# Patient Record
Sex: MALE | Race: WHITE | NOT HISPANIC OR LATINO | Employment: OTHER | URBAN - METROPOLITAN AREA
[De-identification: names, ages, dates, MRNs, and addresses within clinical notes are randomized per-mention and may not be internally consistent; named-entity substitution may affect disease eponyms.]

---

## 2019-11-03 ENCOUNTER — HOSPITAL ENCOUNTER (OUTPATIENT)
Facility: MEDICAL CENTER | Age: 64
End: 2019-11-03

## 2019-11-04 ENCOUNTER — HOSPITAL ENCOUNTER (INPATIENT)
Facility: MEDICAL CENTER | Age: 64
LOS: 4 days | DRG: 246 | End: 2019-11-08
Attending: INTERNAL MEDICINE | Admitting: INTERNAL MEDICINE
Payer: MEDICARE

## 2019-11-04 ENCOUNTER — APPOINTMENT (OUTPATIENT)
Dept: CARDIOLOGY | Facility: MEDICAL CENTER | Age: 64
DRG: 246 | End: 2019-11-04
Attending: INTERNAL MEDICINE
Payer: MEDICARE

## 2019-11-04 ENCOUNTER — HOSPITAL ENCOUNTER (OUTPATIENT)
Dept: RADIOLOGY | Facility: MEDICAL CENTER | Age: 64
End: 2019-11-04

## 2019-11-04 DIAGNOSIS — I24.9 ACS (ACUTE CORONARY SYNDROME) (HCC): ICD-10-CM

## 2019-11-04 PROBLEM — I10 ESSENTIAL HYPERTENSION: Status: ACTIVE | Noted: 2019-11-04

## 2019-11-04 PROBLEM — I21.4 NSTEMI (NON-ST ELEVATED MYOCARDIAL INFARCTION) (HCC): Status: ACTIVE | Noted: 2019-11-04

## 2019-11-04 PROBLEM — Z72.0 TOBACCO ABUSE: Status: ACTIVE | Noted: 2019-11-04

## 2019-11-04 LAB
ANION GAP SERPL CALC-SCNC: 5 MMOL/L (ref 0–11.9)
APTT PPP: 41.7 SEC (ref 24.7–36)
APTT PPP: 74.2 SEC (ref 24.7–36)
APTT PPP: 74.6 SEC (ref 24.7–36)
BASOPHILS # BLD AUTO: 0.3 % (ref 0–1.8)
BASOPHILS # BLD: 0.03 K/UL (ref 0–0.12)
BUN SERPL-MCNC: 9 MG/DL (ref 8–22)
CALCIUM SERPL-MCNC: 8.7 MG/DL (ref 8.5–10.5)
CHLORIDE SERPL-SCNC: 109 MMOL/L (ref 96–112)
CO2 SERPL-SCNC: 24 MMOL/L (ref 20–33)
CREAT SERPL-MCNC: 0.68 MG/DL (ref 0.5–1.4)
EKG IMPRESSION: NORMAL
EOSINOPHIL # BLD AUTO: 0.11 K/UL (ref 0–0.51)
EOSINOPHIL NFR BLD: 1 % (ref 0–6.9)
ERYTHROCYTE [DISTWIDTH] IN BLOOD BY AUTOMATED COUNT: 44.2 FL (ref 35.9–50)
EST. AVERAGE GLUCOSE BLD GHB EST-MCNC: 123 MG/DL
GLUCOSE SERPL-MCNC: 96 MG/DL (ref 65–99)
HBA1C MFR BLD: 5.9 % (ref 0–5.6)
HCT VFR BLD AUTO: 45 % (ref 42–52)
HGB BLD-MCNC: 14.9 G/DL (ref 14–18)
IMM GRANULOCYTES # BLD AUTO: 0.04 K/UL (ref 0–0.11)
IMM GRANULOCYTES NFR BLD AUTO: 0.4 % (ref 0–0.9)
INR PPP: 1.09 (ref 0.87–1.13)
LV EJECT FRACT  99904: 45
LV EJECT FRACT MOD 2C 99903: 64.97
LV EJECT FRACT MOD 4C 99902: 57.49
LV EJECT FRACT MOD BP 99901: 62.9
LYMPHOCYTES # BLD AUTO: 2.67 K/UL (ref 1–4.8)
LYMPHOCYTES NFR BLD: 25.1 % (ref 22–41)
MCH RBC QN AUTO: 33.4 PG (ref 27–33)
MCHC RBC AUTO-ENTMCNC: 33.1 G/DL (ref 33.7–35.3)
MCV RBC AUTO: 100.9 FL (ref 81.4–97.8)
MONOCYTES # BLD AUTO: 0.74 K/UL (ref 0–0.85)
MONOCYTES NFR BLD AUTO: 6.9 % (ref 0–13.4)
NEUTROPHILS # BLD AUTO: 7.06 K/UL (ref 1.82–7.42)
NEUTROPHILS NFR BLD: 66.3 % (ref 44–72)
NRBC # BLD AUTO: 0 K/UL
NRBC BLD-RTO: 0 /100 WBC
NT-PROBNP SERPL IA-MCNC: 139 PG/ML (ref 0–125)
PLATELET # BLD AUTO: 161 K/UL (ref 164–446)
PMV BLD AUTO: 10.3 FL (ref 9–12.9)
POTASSIUM SERPL-SCNC: 4 MMOL/L (ref 3.6–5.5)
PROTHROMBIN TIME: 14.4 SEC (ref 12–14.6)
RBC # BLD AUTO: 4.46 M/UL (ref 4.7–6.1)
SODIUM SERPL-SCNC: 138 MMOL/L (ref 135–145)
TROPONIN T SERPL-MCNC: 382 NG/L (ref 6–19)
TROPONIN T SERPL-MCNC: 960 NG/L (ref 6–19)
TROPONIN T SERPL-MCNC: 966 NG/L (ref 6–19)
TSH SERPL DL<=0.005 MIU/L-ACNC: 0.81 UIU/ML (ref 0.38–5.33)
WBC # BLD AUTO: 10.7 K/UL (ref 4.8–10.8)

## 2019-11-04 PROCEDURE — A9270 NON-COVERED ITEM OR SERVICE: HCPCS | Performed by: INTERNAL MEDICINE

## 2019-11-04 PROCEDURE — 85025 COMPLETE CBC W/AUTO DIFF WBC: CPT

## 2019-11-04 PROCEDURE — 36415 COLL VENOUS BLD VENIPUNCTURE: CPT

## 2019-11-04 PROCEDURE — 93005 ELECTROCARDIOGRAM TRACING: CPT | Performed by: INTERNAL MEDICINE

## 2019-11-04 PROCEDURE — 99358 PROLONG SERVICE W/O CONTACT: CPT | Performed by: INTERNAL MEDICINE

## 2019-11-04 PROCEDURE — 84443 ASSAY THYROID STIM HORMONE: CPT

## 2019-11-04 PROCEDURE — 93010 ELECTROCARDIOGRAM REPORT: CPT | Mod: 76 | Performed by: INTERNAL MEDICINE

## 2019-11-04 PROCEDURE — 80048 BASIC METABOLIC PNL TOTAL CA: CPT

## 2019-11-04 PROCEDURE — 85610 PROTHROMBIN TIME: CPT

## 2019-11-04 PROCEDURE — 99223 1ST HOSP IP/OBS HIGH 75: CPT | Performed by: INTERNAL MEDICINE

## 2019-11-04 PROCEDURE — 700111 HCHG RX REV CODE 636 W/ 250 OVERRIDE (IP): Performed by: INTERNAL MEDICINE

## 2019-11-04 PROCEDURE — A9270 NON-COVERED ITEM OR SERVICE: HCPCS | Performed by: HOSPITALIST

## 2019-11-04 PROCEDURE — 83880 ASSAY OF NATRIURETIC PEPTIDE: CPT

## 2019-11-04 PROCEDURE — 99407 BEHAV CHNG SMOKING > 10 MIN: CPT | Performed by: INTERNAL MEDICINE

## 2019-11-04 PROCEDURE — 99223 1ST HOSP IP/OBS HIGH 75: CPT | Mod: 25 | Performed by: INTERNAL MEDICINE

## 2019-11-04 PROCEDURE — 93005 ELECTROCARDIOGRAM TRACING: CPT | Performed by: HOSPITALIST

## 2019-11-04 PROCEDURE — 700102 HCHG RX REV CODE 250 W/ 637 OVERRIDE(OP): Performed by: HOSPITALIST

## 2019-11-04 PROCEDURE — 93010 ELECTROCARDIOGRAM REPORT: CPT | Performed by: INTERNAL MEDICINE

## 2019-11-04 PROCEDURE — 83036 HEMOGLOBIN GLYCOSYLATED A1C: CPT

## 2019-11-04 PROCEDURE — 85730 THROMBOPLASTIN TIME PARTIAL: CPT

## 2019-11-04 PROCEDURE — 93306 TTE W/DOPPLER COMPLETE: CPT

## 2019-11-04 PROCEDURE — 770020 HCHG ROOM/CARE - TELE (206)

## 2019-11-04 PROCEDURE — 94760 N-INVAS EAR/PLS OXIMETRY 1: CPT

## 2019-11-04 PROCEDURE — 84484 ASSAY OF TROPONIN QUANT: CPT

## 2019-11-04 PROCEDURE — 700102 HCHG RX REV CODE 250 W/ 637 OVERRIDE(OP): Performed by: INTERNAL MEDICINE

## 2019-11-04 PROCEDURE — 700105 HCHG RX REV CODE 258: Performed by: INTERNAL MEDICINE

## 2019-11-04 PROCEDURE — 93306 TTE W/DOPPLER COMPLETE: CPT | Mod: 26 | Performed by: INTERNAL MEDICINE

## 2019-11-04 RX ORDER — ATORVASTATIN CALCIUM 80 MG/1
80 TABLET, FILM COATED ORAL EVERY EVENING
Status: DISCONTINUED | OUTPATIENT
Start: 2019-11-04 | End: 2019-11-08 | Stop reason: HOSPADM

## 2019-11-04 RX ORDER — ACETAMINOPHEN 325 MG/1
650 TABLET ORAL EVERY 6 HOURS PRN
Status: DISCONTINUED | OUTPATIENT
Start: 2019-11-04 | End: 2019-11-08 | Stop reason: HOSPADM

## 2019-11-04 RX ORDER — HEPARIN SODIUM 5000 [USP'U]/100ML
INJECTION, SOLUTION INTRAVENOUS CONTINUOUS
Status: DISCONTINUED | OUTPATIENT
Start: 2019-11-04 | End: 2019-11-05

## 2019-11-04 RX ORDER — SODIUM CHLORIDE 9 MG/ML
INJECTION, SOLUTION INTRAVENOUS CONTINUOUS
Status: DISCONTINUED | OUTPATIENT
Start: 2019-11-04 | End: 2019-11-05

## 2019-11-04 RX ORDER — ONDANSETRON 4 MG/1
4 TABLET, ORALLY DISINTEGRATING ORAL EVERY 4 HOURS PRN
Status: DISCONTINUED | OUTPATIENT
Start: 2019-11-04 | End: 2019-11-08 | Stop reason: HOSPADM

## 2019-11-04 RX ORDER — BISACODYL 10 MG
10 SUPPOSITORY, RECTAL RECTAL
Status: DISCONTINUED | OUTPATIENT
Start: 2019-11-04 | End: 2019-11-08 | Stop reason: HOSPADM

## 2019-11-04 RX ORDER — HEPARIN SODIUM 1000 [USP'U]/ML
2200 INJECTION, SOLUTION INTRAVENOUS; SUBCUTANEOUS PRN
Status: DISCONTINUED | OUTPATIENT
Start: 2019-11-04 | End: 2019-11-05

## 2019-11-04 RX ORDER — MORPHINE SULFATE 4 MG/ML
2-4 INJECTION, SOLUTION INTRAMUSCULAR; INTRAVENOUS
Status: DISCONTINUED | OUTPATIENT
Start: 2019-11-04 | End: 2019-11-08 | Stop reason: HOSPADM

## 2019-11-04 RX ORDER — AMOXICILLIN 250 MG
2 CAPSULE ORAL 2 TIMES DAILY
Status: DISCONTINUED | OUTPATIENT
Start: 2019-11-04 | End: 2019-11-08 | Stop reason: HOSPADM

## 2019-11-04 RX ORDER — NITROGLYCERIN 0.4 MG/1
0.4 TABLET SUBLINGUAL
Status: DISCONTINUED | OUTPATIENT
Start: 2019-11-04 | End: 2019-11-08 | Stop reason: HOSPADM

## 2019-11-04 RX ORDER — POLYETHYLENE GLYCOL 3350 17 G/17G
1 POWDER, FOR SOLUTION ORAL
Status: DISCONTINUED | OUTPATIENT
Start: 2019-11-04 | End: 2019-11-08 | Stop reason: HOSPADM

## 2019-11-04 RX ORDER — ONDANSETRON 2 MG/ML
4 INJECTION INTRAMUSCULAR; INTRAVENOUS EVERY 4 HOURS PRN
Status: DISCONTINUED | OUTPATIENT
Start: 2019-11-04 | End: 2019-11-08 | Stop reason: HOSPADM

## 2019-11-04 RX ADMIN — SODIUM CHLORIDE: 9 INJECTION, SOLUTION INTRAVENOUS at 06:08

## 2019-11-04 RX ADMIN — ATORVASTATIN CALCIUM 80 MG: 80 TABLET, FILM COATED ORAL at 16:59

## 2019-11-04 RX ADMIN — SODIUM CHLORIDE: 9 INJECTION, SOLUTION INTRAVENOUS at 20:02

## 2019-11-04 RX ADMIN — NITROGLYCERIN 0.4 MG: 0.4 TABLET, ORALLY DISINTEGRATING SUBLINGUAL at 21:02

## 2019-11-04 RX ADMIN — METOPROLOL TARTRATE 12.5 MG: 25 TABLET, FILM COATED ORAL at 06:10

## 2019-11-04 RX ADMIN — METOPROLOL TARTRATE 12.5 MG: 25 TABLET, FILM COATED ORAL at 17:00

## 2019-11-04 RX ADMIN — HEPARIN SODIUM 900 UNITS/HR: 5000 INJECTION, SOLUTION INTRAVENOUS at 06:32

## 2019-11-04 RX ADMIN — ACETAMINOPHEN 650 MG: 325 TABLET, FILM COATED ORAL at 19:20

## 2019-11-04 RX ADMIN — NITROGLYCERIN 0.4 MG: 0.4 TABLET, ORALLY DISINTEGRATING SUBLINGUAL at 16:57

## 2019-11-04 RX ADMIN — MORPHINE SULFATE 2 MG: 4 INJECTION INTRAVENOUS at 06:09

## 2019-11-04 RX ADMIN — THERA TABS 1 TABLET: TAB at 14:38

## 2019-11-04 SDOH — ECONOMIC STABILITY: TRANSPORTATION INSECURITY
IN THE PAST 12 MONTHS, HAS LACK OF TRANSPORTATION KEPT YOU FROM MEETINGS, WORK, OR FROM GETTING THINGS NEEDED FOR DAILY LIVING?: PATIENT DECLINED

## 2019-11-04 SDOH — ECONOMIC STABILITY: TRANSPORTATION INSECURITY
IN THE PAST 12 MONTHS, HAS THE LACK OF TRANSPORTATION KEPT YOU FROM MEDICAL APPOINTMENTS OR FROM GETTING MEDICATIONS?: PATIENT DECLINED

## 2019-11-04 SDOH — ECONOMIC STABILITY: FOOD INSECURITY: WITHIN THE PAST 12 MONTHS, YOU WORRIED THAT YOUR FOOD WOULD RUN OUT BEFORE YOU GOT MONEY TO BUY MORE.: PATIENT DECLINED

## 2019-11-04 SDOH — ECONOMIC STABILITY: FOOD INSECURITY: WITHIN THE PAST 12 MONTHS, THE FOOD YOU BOUGHT JUST DIDN'T LAST AND YOU DIDN'T HAVE MONEY TO GET MORE.: PATIENT DECLINED

## 2019-11-04 ASSESSMENT — ENCOUNTER SYMPTOMS
BACK PAIN: 0
DIARRHEA: 0
ABDOMINAL PAIN: 0
NAUSEA: 1
BLURRED VISION: 0
FEVER: 0
DIAPHORESIS: 0
COUGH: 0
NECK PAIN: 0
SHORTNESS OF BREATH: 0
SPUTUM PRODUCTION: 0
WHEEZING: 0
CHILLS: 0
DIZZINESS: 0
VOMITING: 0
HEADACHES: 0
FOCAL WEAKNESS: 0
MYALGIAS: 0
SEIZURES: 0
FLANK PAIN: 0
BRUISES/BLEEDS EASILY: 0
SORE THROAT: 0
PALPITATIONS: 0
BLOOD IN STOOL: 0

## 2019-11-04 ASSESSMENT — LIFESTYLE VARIABLES
ALCOHOL_USE: NO
CONSUMPTION TOTAL: NEGATIVE
ON A TYPICAL DAY WHEN YOU DRINK ALCOHOL HOW MANY DRINKS DO YOU HAVE: 0
TOTAL SCORE: 0
EVER_SMOKED: YES
EVER FELT BAD OR GUILTY ABOUT YOUR DRINKING: NO
EVER_SMOKED: YES
HOW MANY TIMES IN THE PAST YEAR HAVE YOU HAD 5 OR MORE DRINKS IN A DAY: 0
TOTAL SCORE: 0
HAVE PEOPLE ANNOYED YOU BY CRITICIZING YOUR DRINKING: NO
EVER HAD A DRINK FIRST THING IN THE MORNING TO STEADY YOUR NERVES TO GET RID OF A HANGOVER: NO
AVERAGE NUMBER OF DAYS PER WEEK YOU HAVE A DRINK CONTAINING ALCOHOL: 0
TOTAL SCORE: 0
HAVE YOU EVER FELT YOU SHOULD CUT DOWN ON YOUR DRINKING: NO

## 2019-11-04 ASSESSMENT — COGNITIVE AND FUNCTIONAL STATUS - GENERAL
SUGGESTED CMS G CODE MODIFIER DAILY ACTIVITY: CH
SUGGESTED CMS G CODE MODIFIER MOBILITY: CH
DAILY ACTIVITIY SCORE: 24
MOBILITY SCORE: 24

## 2019-11-04 ASSESSMENT — COPD QUESTIONNAIRES
COPD SCREENING SCORE: 4
DURING THE PAST 4 WEEKS HOW MUCH DID YOU FEEL SHORT OF BREATH: NONE/LITTLE OF THE TIME
DURING THE PAST 4 WEEKS HOW MUCH DID YOU FEEL SHORT OF BREATH: NONE/LITTLE OF THE TIME
COPD SCREENING SCORE: 4
IN THE PAST 12 MONTHS DO YOU DO LESS THAN YOU USED TO BECAUSE OF YOUR BREATHING PROBLEMS: DISAGREE/UNSURE
HAVE YOU SMOKED AT LEAST 100 CIGARETTES IN YOUR ENTIRE LIFE: YES
DO YOU EVER COUGH UP ANY MUCUS OR PHLEGM?: NO/ONLY WITH OCCASIONAL COLDS OR INFECTIONS
HAVE YOU SMOKED AT LEAST 100 CIGARETTES IN YOUR ENTIRE LIFE: YES
DO YOU EVER COUGH UP ANY MUCUS OR PHLEGM?: NO/ONLY WITH OCCASIONAL COLDS OR INFECTIONS

## 2019-11-04 ASSESSMENT — PATIENT HEALTH QUESTIONNAIRE - PHQ9
1. LITTLE INTEREST OR PLEASURE IN DOING THINGS: NOT AT ALL
SUM OF ALL RESPONSES TO PHQ9 QUESTIONS 1 AND 2: 0
2. FEELING DOWN, DEPRESSED, IRRITABLE, OR HOPELESS: NOT AT ALL

## 2019-11-04 NOTE — PROGRESS NOTES
No labs, consults or H&P noted in chart.  Left message for Dr. Hernandez.  Called Dr. Manning for cardiology consult regarding chest pain episode and elevated troponin T 384, npo on heparin gtts.  Plan for cath lab tomorrow, ok to eat.  Cbc, BMP, trending troponins.
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
## 2019-11-04 NOTE — PROGRESS NOTES
Call to EKG tech to do an EKG, previous tech did not follow physicians orders.      EKG to come do EKG now.      Bryson Ramos

## 2019-11-04 NOTE — CARE PLAN
Problem: Safety  Goal: Will remain free from injury  Outcome: PROGRESSING AS EXPECTED  Goal: Will remain free from falls  Outcome: PROGRESSING AS EXPECTED   Fall precautions in place.   Problem: Venous Thromboembolism (VTW)/Deep Vein Thrombosis (DVT) Prevention:  Goal: Patient will participate in Venous Thrombosis (VTE)/Deep Vein Thrombosis (DVT)Prevention Measures  Outcome: PROGRESSING AS EXPECTED   Heparin gtt in place.   Problem: Knowledge Deficit  Goal: Knowledge of disease process/condition, treatment plan, diagnostic tests, and medications will improve  Outcome: PROGRESSING AS EXPECTED   Updated pt on POC.

## 2019-11-04 NOTE — PROGRESS NOTES
Direct admit from [facility], referred by CIARA Fitzgerald. For Chest pain. Admitting MD is Dr. David OVIEDO. Upon patient's arrival release signed and held orders, page admit hospitalist on call for orders.

## 2019-11-04 NOTE — CARE PLAN
Problem: Communication  Goal: The ability to communicate needs accurately and effectively will improve  Outcome: PROGRESSING AS EXPECTED  Note:   Discussed POC with patient, patient agrees to participate in POC.  Patient encouraged to use call bell to ring for assistance.  Patient oriented to room, call bell, and bed controls.  Open line of communication established with the patient.         Problem: Safety  Goal: Will remain free from injury  Outcome: PROGRESSING AS EXPECTED  Note:   Instructed patient to use call bell and ring for assistance prior to ambulation.  Non-Skid foot ware in place, bed in low locked position, call bell and personal belongings within reach.         Problem: Venous Thromboembolism (VTW)/Deep Vein Thrombosis (DVT) Prevention:  Goal: Patient will participate in Venous Thrombosis (VTE)/Deep Vein Thrombosis (DVT)Prevention Measures  Outcome: PROGRESSING AS EXPECTED  Note:   Will be placed on Heparin infusion.       Problem: Knowledge Deficit  Goal: Knowledge of disease process/condition, treatment plan, diagnostic tests, and medications will improve  Outcome: PROGRESSING AS EXPECTED  Note:   Discussed plan of care and medications with patient.  Patient verbalizes understandings of treatment regimen.         Problem: Pain Management  Goal: Pain level will decrease to patient's comfort goal  Outcome: PROGRESSING AS EXPECTED  Note:   Chest Discomfort, PRN morphine and nitro ordered.

## 2019-11-04 NOTE — PROGRESS NOTES
Assumed care of patient at bedside report from NOC RN. Updated on POC. Patient currently A & O x 4; on RA; up standby assist; without complaints of acute pain. Call light within reach. Whiteboard updated. Fall precautions in place. Bed locked and in lowest position. All questions answered. No other needs indicated at this time.

## 2019-11-04 NOTE — ASSESSMENT & PLAN NOTE
Patient has been given full dose of aspirin, s/p heparin gtt  - s/p JIMENA x 2 (co-LAD x 2)  - metoprolol 12.5 mg twice daily with holding parameters  - atorvastatin 80 mg daily  - s/p lipid panel, TSH and hemoglobin A1c  -Cardiology following, appreciate recs  -- DAPT Effient and aspirin.  Recurrent pain with EKG changes leading to repeat cath which was neg for re-stenosis

## 2019-11-04 NOTE — PROGRESS NOTES
Received pt report Dary    Pt awake, alert, oriented X 4.  Pt resting in bed. Pt up to the bathroom.  Bed in low locked position, call bell at the bedside, tray table & personal belongings within reach. Non-skid footwear intact. White board updated to reflect plan of care for current shift. Pt door tags reviewed. Bed Alarm OFF.    Assessed patient’s Neuro Status, Comfort Level, Immediate Needs.    Dr. Hernandez at bedside to eval and admit patient.      Patient complaining of 2/10 chest discomfort.  PRN 2mg Morphine given.      Vitals WNL.    Tele NSR.    Bryson Ramos

## 2019-11-04 NOTE — H&P
Hospital Medicine History & Physical Note    Date of Service  11/4/2019    Primary Care Physician  No primary care provider on file.    Consultants  Cardiology Dr. Padilla    Code Status  Full code    Chief Complaint  Chest pain    History of Presenting Illness  64 y.o. male who presented 11/4/2019 with chest pain that started yesterday evening.  The patient states he developed substernal chest pain with radiation down his left arm associated with tingling of his fingers.  He rated the pain at 8/10 in intensity.  He  reported associated nausea.  He denied any relieving or exacerbating factors.  He denied any fevers, chills, shortness of breath, cough, diaphoresis or lightheadedness.  He denies any previous history of MI or stroke.  He denies any hypertension or hyperlipidemia.  He reports smoking 5 to 6 cigarettes a day.  Quit drinking alcohol 1 year ago.  He denies any illicit drug use.  He denies any history of MI in his family.  At this time the patient reports his chest pain is 1/10.    He presented to [facility].  I have reviewed the medical records from the transferring facility which are summarized as follows:    EKG interpreted by me reveals sinus rhythm with minimal ST elevation in inferior leads.  Does not meet STEMI criteria.  EKG was adjudicated by Dr. Padilla with cardiology.    CTA chest with IV contrast: No pulmonary embolism.  No pericardial effusion.  Atelectasis versus mild groundglass infiltrate in right lower lobe.  Otherwise no acute process identified.  Chest x-ray: No acute pulmonary disease cardiac silhouette is normal in size    Troponin  Less than 0.01 > 0.19  WBC 9.2, hemoglobin 16, hematocrit 46.8, MCV 98, platelets 197  PT 12.1, INR 1.1, APTT 32  Sodium 141, potassium 3.8, chloride 104, CO2 26, anion gap 11, BUN 18, creatinine 0.7, glucose 83, calcium 9.4, protein 7.9, albumin 4.7, total bilirubin 0.5, AST 22, ALT 19, alkaline phosphatase 74    Patient was given aspirin  325, IV heparin, IV morphine and IV Zofran.  Patient was given sublingual nitroglycerin with improvement of his pain     Review of Systems  Review of Systems   Constitutional: Negative for chills, diaphoresis and fever.   HENT: Negative for hearing loss and sore throat.    Eyes: Negative for blurred vision.   Respiratory: Negative for cough, sputum production, shortness of breath and wheezing.    Cardiovascular: Positive for chest pain. Negative for palpitations and leg swelling.   Gastrointestinal: Positive for nausea. Negative for abdominal pain, blood in stool, diarrhea and vomiting.   Genitourinary: Negative for dysuria, flank pain and urgency.   Musculoskeletal: Negative for back pain, joint pain, myalgias and neck pain.   Skin: Negative for rash.   Neurological: Negative for dizziness, focal weakness, seizures and headaches.   Endo/Heme/Allergies: Does not bruise/bleed easily.   Psychiatric/Behavioral: Negative for suicidal ideas.   All other systems reviewed and are negative.      Past Medical History  Tobacco abuse    Surgical History  No pertinent surgical history    Family History  No pertinent family history    Social History   reports that he has been smoking cigars. He has a 5.00 pack-year smoking history. He has never used smokeless tobacco. He reports previous alcohol use. He reports that he does not use drugs.    Allergies  NKDA    Medications  None       Physical Exam  Temp:  [37.2 °C (98.9 °F)] 37.2 °C (98.9 °F)  Pulse:  [65] 65  Resp:  [16] 16  BP: (159)/(88) 159/88  SpO2:  [95 %] 95 %    Physical Exam  Vitals signs and nursing note reviewed.   Constitutional:       Appearance: Normal appearance.   HENT:      Head: Normocephalic and atraumatic.      Nose: Nose normal.      Mouth/Throat:      Mouth: Mucous membranes are moist.   Eyes:      Conjunctiva/sclera: Conjunctivae normal.      Pupils: Pupils are equal, round, and reactive to light.   Neck:      Musculoskeletal: Normal range of motion and  neck supple.   Cardiovascular:      Rate and Rhythm: Normal rate and regular rhythm.      Pulses: Normal pulses.      Heart sounds: Normal heart sounds.   Pulmonary:      Effort: Pulmonary effort is normal. No respiratory distress.      Breath sounds: Normal breath sounds. No wheezing, rhonchi or rales.   Abdominal:      General: Bowel sounds are normal. There is no distension.      Palpations: Abdomen is soft.      Tenderness: There is no tenderness.   Musculoskeletal: Normal range of motion.         General: No swelling or tenderness.   Lymphadenopathy:      Cervical: No cervical adenopathy.   Skin:     General: Skin is warm.   Neurological:      General: No focal deficit present.      Mental Status: He is alert and oriented to person, place, and time.      Cranial Nerves: No cranial nerve deficit.   Psychiatric:         Mood and Affect: Mood normal.         Behavior: Behavior normal.         Laboratory:          No results for input(s): ALTSGPT, ASTSGOT, ALKPHOSPHAT, TBILIRUBIN, DBILIRUBIN, GAMMAGT, AMYLASE, LIPASE, ALB, PREALBUMIN, GLUCOSE in the last 72 hours.      No results for input(s): NTPROBNP in the last 72 hours.      No results for input(s): TROPONINT in the last 72 hours.    Urinalysis:    No results found     Imaging:  EC-ECHOCARDIOGRAM COMPLETE W/O CONT    (Results Pending)         Assessment/Plan:  I anticipate this patient will require at least two midnights for appropriate medical management, necessitating inpatient admission.    NSTEMI (non-ST elevated myocardial infarction) (HCC)- (present on admission)  Assessment & Plan  Patient will be admitted to the telemetry unit with close cardiac monitoring, serial EKG and troponin  Patient has been given full dose of aspirin and is started on IV heparin, monitor APTT  I will start the patient on metoprolol 12.5 mg twice daily with holding parameters and atorvastatin 80 mg daily  Check 2D echo, lipid panel, TSH and hemoglobin A1c  Nitro and morphine when  necessary for chest pain  Cardiology has been consulted      Essential hypertension- (present on admission)  Assessment & Plan  Uncontrolled  I have started the patient on metoprolol 12.5 mg twice daily    Tobacco abuse- (present on admission)  Assessment & Plan  Tobacco cessation education provided for more than 11 minutes, discussed options of nicotine patch, medical treatment with wellbutrin and chantix. Discussed the risks of smoking including increased risk of heart disease, stroke, cancer and COPD. Discussed the benefits of quitting smoking.  Patient states he will quit smoking.  We will avoid nicotine replacement in the setting of ACS        VTE prophylaxis: Heparin    I spent a total of 35 minutes of non face to face time performing additional research, reviewing medical records from transferring facility, discussing plan of care with other healthcare providers. Start time: 7 45 pm. End time: 8:20 pm

## 2019-11-04 NOTE — PROGRESS NOTES
Call from lab with a critical troponin 382.      Patient on Heparin gtt at 900u/hr 18cc/hr via left AC  Patient on NS @ 83cc/hr via right AV    Pending cardiac evaluation.      Bryson Ramos

## 2019-11-05 ENCOUNTER — APPOINTMENT (OUTPATIENT)
Dept: CARDIOLOGY | Facility: MEDICAL CENTER | Age: 64
DRG: 246 | End: 2019-11-05
Attending: INTERNAL MEDICINE
Payer: MEDICARE

## 2019-11-05 PROBLEM — I50.21 ACUTE SYSTOLIC HEART FAILURE (HCC): Status: ACTIVE | Noted: 2019-11-05

## 2019-11-05 LAB
ALBUMIN SERPL BCP-MCNC: 3.8 G/DL (ref 3.2–4.9)
ALBUMIN/GLOB SERPL: 1.5 G/DL
ALP SERPL-CCNC: 58 U/L (ref 30–99)
ALT SERPL-CCNC: 23 U/L (ref 2–50)
ANION GAP SERPL CALC-SCNC: 8 MMOL/L (ref 0–11.9)
APTT PPP: 75.3 SEC (ref 24.7–36)
AST SERPL-CCNC: 85 U/L (ref 12–45)
BASOPHILS # BLD AUTO: 0.2 % (ref 0–1.8)
BASOPHILS # BLD: 0.02 K/UL (ref 0–0.12)
BILIRUB SERPL-MCNC: 0.9 MG/DL (ref 0.1–1.5)
BUN SERPL-MCNC: 11 MG/DL (ref 8–22)
CALCIUM SERPL-MCNC: 8.5 MG/DL (ref 8.5–10.5)
CHLORIDE SERPL-SCNC: 109 MMOL/L (ref 96–112)
CHOLEST SERPL-MCNC: 161 MG/DL (ref 100–199)
CO2 SERPL-SCNC: 22 MMOL/L (ref 20–33)
CREAT SERPL-MCNC: 0.66 MG/DL (ref 0.5–1.4)
EOSINOPHIL # BLD AUTO: 0.07 K/UL (ref 0–0.51)
EOSINOPHIL NFR BLD: 0.6 % (ref 0–6.9)
ERYTHROCYTE [DISTWIDTH] IN BLOOD BY AUTOMATED COUNT: 41.3 FL (ref 35.9–50)
GLOBULIN SER CALC-MCNC: 2.6 G/DL (ref 1.9–3.5)
GLUCOSE SERPL-MCNC: 113 MG/DL (ref 65–99)
HCT VFR BLD AUTO: 40.6 % (ref 42–52)
HDLC SERPL-MCNC: 37 MG/DL
HGB BLD-MCNC: 14 G/DL (ref 14–18)
IMM GRANULOCYTES # BLD AUTO: 0.03 K/UL (ref 0–0.11)
IMM GRANULOCYTES NFR BLD AUTO: 0.3 % (ref 0–0.9)
LDLC SERPL CALC-MCNC: 110 MG/DL
LYMPHOCYTES # BLD AUTO: 1.99 K/UL (ref 1–4.8)
LYMPHOCYTES NFR BLD: 16.9 % (ref 22–41)
MCH RBC QN AUTO: 33.3 PG (ref 27–33)
MCHC RBC AUTO-ENTMCNC: 34.5 G/DL (ref 33.7–35.3)
MCV RBC AUTO: 96.4 FL (ref 81.4–97.8)
MONOCYTES # BLD AUTO: 1.03 K/UL (ref 0–0.85)
MONOCYTES NFR BLD AUTO: 8.8 % (ref 0–13.4)
NEUTROPHILS # BLD AUTO: 8.62 K/UL (ref 1.82–7.42)
NEUTROPHILS NFR BLD: 73.2 % (ref 44–72)
NRBC # BLD AUTO: 0 K/UL
NRBC BLD-RTO: 0 /100 WBC
PLATELET # BLD AUTO: 163 K/UL (ref 164–446)
PMV BLD AUTO: 9.7 FL (ref 9–12.9)
POTASSIUM SERPL-SCNC: 3.9 MMOL/L (ref 3.6–5.5)
PROT SERPL-MCNC: 6.4 G/DL (ref 6–8.2)
RBC # BLD AUTO: 4.21 M/UL (ref 4.7–6.1)
SODIUM SERPL-SCNC: 139 MMOL/L (ref 135–145)
TRIGL SERPL-MCNC: 71 MG/DL (ref 0–149)
WBC # BLD AUTO: 11.8 K/UL (ref 4.8–10.8)

## 2019-11-05 PROCEDURE — 700101 HCHG RX REV CODE 250

## 2019-11-05 PROCEDURE — 700111 HCHG RX REV CODE 636 W/ 250 OVERRIDE (IP): Performed by: INTERNAL MEDICINE

## 2019-11-05 PROCEDURE — 700102 HCHG RX REV CODE 250 W/ 637 OVERRIDE(OP): Performed by: INTERNAL MEDICINE

## 2019-11-05 PROCEDURE — 85025 COMPLETE CBC W/AUTO DIFF WBC: CPT

## 2019-11-05 PROCEDURE — 700102 HCHG RX REV CODE 250 W/ 637 OVERRIDE(OP)

## 2019-11-05 PROCEDURE — A9270 NON-COVERED ITEM OR SERVICE: HCPCS

## 2019-11-05 PROCEDURE — 700117 HCHG RX CONTRAST REV CODE 255: Performed by: INTERNAL MEDICINE

## 2019-11-05 PROCEDURE — 027034Z DILATION OF CORONARY ARTERY, ONE ARTERY WITH DRUG-ELUTING INTRALUMINAL DEVICE, PERCUTANEOUS APPROACH: ICD-10-PCS | Performed by: INTERNAL MEDICINE

## 2019-11-05 PROCEDURE — 700111 HCHG RX REV CODE 636 W/ 250 OVERRIDE (IP)

## 2019-11-05 PROCEDURE — 99153 MOD SED SAME PHYS/QHP EA: CPT

## 2019-11-05 PROCEDURE — 99233 SBSQ HOSP IP/OBS HIGH 50: CPT | Performed by: INTERNAL MEDICINE

## 2019-11-05 PROCEDURE — 80053 COMPREHEN METABOLIC PANEL: CPT

## 2019-11-05 PROCEDURE — 85730 THROMBOPLASTIN TIME PARTIAL: CPT

## 2019-11-05 PROCEDURE — B2151ZZ FLUOROSCOPY OF LEFT HEART USING LOW OSMOLAR CONTRAST: ICD-10-PCS | Performed by: INTERNAL MEDICINE

## 2019-11-05 PROCEDURE — A9270 NON-COVERED ITEM OR SERVICE: HCPCS | Performed by: HOSPITALIST

## 2019-11-05 PROCEDURE — 92928 PRQ TCAT PLMT NTRAC ST 1 LES: CPT | Mod: LD | Performed by: INTERNAL MEDICINE

## 2019-11-05 PROCEDURE — 4A023N7 MEASUREMENT OF CARDIAC SAMPLING AND PRESSURE, LEFT HEART, PERCUTANEOUS APPROACH: ICD-10-PCS | Performed by: INTERNAL MEDICINE

## 2019-11-05 PROCEDURE — A9270 NON-COVERED ITEM OR SERVICE: HCPCS | Performed by: INTERNAL MEDICINE

## 2019-11-05 PROCEDURE — 99152 MOD SED SAME PHYS/QHP 5/>YRS: CPT | Performed by: INTERNAL MEDICINE

## 2019-11-05 PROCEDURE — 700105 HCHG RX REV CODE 258: Performed by: INTERNAL MEDICINE

## 2019-11-05 PROCEDURE — B2111ZZ FLUOROSCOPY OF MULTIPLE CORONARY ARTERIES USING LOW OSMOLAR CONTRAST: ICD-10-PCS | Performed by: INTERNAL MEDICINE

## 2019-11-05 PROCEDURE — 770020 HCHG ROOM/CARE - TELE (206)

## 2019-11-05 PROCEDURE — 80061 LIPID PANEL: CPT

## 2019-11-05 PROCEDURE — 93458 L HRT ARTERY/VENTRICLE ANGIO: CPT | Mod: 26,59 | Performed by: INTERNAL MEDICINE

## 2019-11-05 PROCEDURE — 700102 HCHG RX REV CODE 250 W/ 637 OVERRIDE(OP): Performed by: HOSPITALIST

## 2019-11-05 RX ORDER — VERAPAMIL HYDROCHLORIDE 2.5 MG/ML
INJECTION, SOLUTION INTRAVENOUS
Status: COMPLETED
Start: 2019-11-05 | End: 2019-11-05

## 2019-11-05 RX ORDER — MIDAZOLAM HYDROCHLORIDE 1 MG/ML
INJECTION INTRAMUSCULAR; INTRAVENOUS
Status: COMPLETED
Start: 2019-11-05 | End: 2019-11-05

## 2019-11-05 RX ORDER — HEPARIN SODIUM,PORCINE 1000/ML
VIAL (ML) INJECTION
Status: COMPLETED
Start: 2019-11-05 | End: 2019-11-05

## 2019-11-05 RX ORDER — PRASUGREL 10 MG/1
TABLET, FILM COATED ORAL
Status: COMPLETED
Start: 2019-11-05 | End: 2019-11-05

## 2019-11-05 RX ORDER — SODIUM CHLORIDE 9 MG/ML
INJECTION, SOLUTION INTRAVENOUS CONTINUOUS
Status: DISCONTINUED | OUTPATIENT
Start: 2019-11-05 | End: 2019-11-05

## 2019-11-05 RX ORDER — PRASUGREL 10 MG/1
60 TABLET, FILM COATED ORAL ONCE
Status: DISCONTINUED | OUTPATIENT
Start: 2019-11-05 | End: 2019-11-05

## 2019-11-05 RX ORDER — PRASUGREL 10 MG/1
10 TABLET, FILM COATED ORAL DAILY
Status: DISCONTINUED | OUTPATIENT
Start: 2019-11-06 | End: 2019-11-08 | Stop reason: HOSPADM

## 2019-11-05 RX ORDER — BIVALIRUDIN 250 MG/5ML
INJECTION, POWDER, LYOPHILIZED, FOR SOLUTION INTRAVENOUS
Status: COMPLETED
Start: 2019-11-05 | End: 2019-11-05

## 2019-11-05 RX ORDER — LIDOCAINE HYDROCHLORIDE 20 MG/ML
INJECTION, SOLUTION INFILTRATION; PERINEURAL
Status: COMPLETED
Start: 2019-11-05 | End: 2019-11-05

## 2019-11-05 RX ORDER — HEPARIN SODIUM 200 [USP'U]/100ML
INJECTION, SOLUTION INTRAVENOUS
Status: COMPLETED
Start: 2019-11-05 | End: 2019-11-05

## 2019-11-05 RX ORDER — SODIUM CHLORIDE 9 MG/ML
INJECTION, SOLUTION INTRAVENOUS CONTINUOUS
Status: DISCONTINUED | OUTPATIENT
Start: 2019-11-05 | End: 2019-11-06

## 2019-11-05 RX ADMIN — ACETAMINOPHEN 650 MG: 325 TABLET, FILM COATED ORAL at 20:09

## 2019-11-05 RX ADMIN — FENTANYL CITRATE 100 MCG: 50 INJECTION, SOLUTION INTRAMUSCULAR; INTRAVENOUS at 16:24

## 2019-11-05 RX ADMIN — METOPROLOL TARTRATE 12.5 MG: 25 TABLET, FILM COATED ORAL at 18:09

## 2019-11-05 RX ADMIN — ATORVASTATIN CALCIUM 80 MG: 80 TABLET, FILM COATED ORAL at 18:09

## 2019-11-05 RX ADMIN — HEPARIN SODIUM: 1000 INJECTION, SOLUTION INTRAVENOUS; SUBCUTANEOUS at 16:25

## 2019-11-05 RX ADMIN — NITROGLYCERIN 10 ML: 20 INJECTION INTRAVENOUS at 16:23

## 2019-11-05 RX ADMIN — METOPROLOL TARTRATE 12.5 MG: 25 TABLET, FILM COATED ORAL at 05:48

## 2019-11-05 RX ADMIN — SODIUM CHLORIDE: 9 INJECTION, SOLUTION INTRAVENOUS at 18:30

## 2019-11-05 RX ADMIN — IOHEXOL 185 ML: 350 INJECTION, SOLUTION INTRAVENOUS at 17:13

## 2019-11-05 RX ADMIN — HEPARIN SODIUM 900 UNITS/HR: 5000 INJECTION, SOLUTION INTRAVENOUS at 09:30

## 2019-11-05 RX ADMIN — THERA TABS 1 TABLET: TAB at 05:46

## 2019-11-05 RX ADMIN — HEPARIN SODIUM: 1000 INJECTION, SOLUTION INTRAVENOUS; SUBCUTANEOUS at 17:00

## 2019-11-05 RX ADMIN — VERAPAMIL HYDROCHLORIDE 2.5 MG: 2.5 INJECTION INTRAVENOUS at 16:24

## 2019-11-05 RX ADMIN — SENNOSIDES AND DOCUSATE SODIUM 2 TABLET: 8.6; 5 TABLET ORAL at 05:46

## 2019-11-05 RX ADMIN — BIVALIRUDIN 250 MG: 250 INJECTION, POWDER, LYOPHILIZED, FOR SOLUTION INTRAVENOUS at 17:08

## 2019-11-05 RX ADMIN — MIDAZOLAM HYDROCHLORIDE 2 MG: 1 INJECTION, SOLUTION INTRAMUSCULAR; INTRAVENOUS at 16:23

## 2019-11-05 RX ADMIN — PRASUGREL 60 MG: 10 TABLET, FILM COATED ORAL at 17:20

## 2019-11-05 RX ADMIN — FENTANYL CITRATE 100 MCG: 50 INJECTION, SOLUTION INTRAMUSCULAR; INTRAVENOUS at 17:07

## 2019-11-05 RX ADMIN — HEPARIN SODIUM 2000 UNITS: 200 INJECTION, SOLUTION INTRAVENOUS at 16:23

## 2019-11-05 RX ADMIN — SENNOSIDES AND DOCUSATE SODIUM 2 TABLET: 8.6; 5 TABLET ORAL at 18:09

## 2019-11-05 RX ADMIN — ASPIRIN 81 MG: 81 TABLET, COATED ORAL at 05:46

## 2019-11-05 RX ADMIN — LIDOCAINE HYDROCHLORIDE: 20 INJECTION, SOLUTION INFILTRATION; PERINEURAL at 16:23

## 2019-11-05 ASSESSMENT — ENCOUNTER SYMPTOMS
PSYCHIATRIC NEGATIVE: 1
SHORTNESS OF BREATH: 0
PALPITATIONS: 0
NECK PAIN: 1
DIARRHEA: 0
FEVER: 0
COUGH: 0
DIZZINESS: 0
DOUBLE VISION: 0
CONSTIPATION: 0
CHILLS: 0
HEADACHES: 0
ABDOMINAL PAIN: 0
VOMITING: 0
BLURRED VISION: 0
NAUSEA: 0

## 2019-11-05 NOTE — PROGRESS NOTES
Monitor Summary  Rhythm: SB/SR  Rate: 50-68  Ectopy: Rare runs of idioventricular.   .20 / .10 / .40

## 2019-11-05 NOTE — PROGRESS NOTES
Zenobia from Lab called with critical result of a troponin of 966 at 22:47. Critical lab result read back to Zenobia.   Dr. Jose notified of critical lab result at 23:21.  Critical lab result read back by Dr. Jose. No new orders at this time.

## 2019-11-05 NOTE — PROGRESS NOTES
· 2 RN skin check complete with HEAVENLY Dasilva.  · Devices in place: None  · Skin assessed under devices: N/A  · Confirmed pressure ulcers found on: N/A  · New potential pressure ulcers noted on: None   · The following interventions in place: Encouraged pt to TCDB and ambulate. Moisturizer and barrier paste in use.     Skin is intact  Elbows red and blanching   Heels red and blanching

## 2019-11-05 NOTE — PROGRESS NOTES
Assumed care of patient at bedside report from NOC RN. Updated on POC. Patient currently A & O x 4; on room air; up self to standby assist, steady on feet with steady gait; without complaints of acute pain. Heparin gtt rate verified. Call light within reach. Whiteboard updated. Fall precautions in place. Bed locked and in lowest position. All questions answered. No other needs indicated at this time.

## 2019-11-05 NOTE — CARE PLAN
Problem: Communication  Goal: The ability to communicate needs accurately and effectively will improve  Outcome: PROGRESSING AS EXPECTED  Intervention: Educate patient and significant other/support system about the plan of care, procedures, treatments, medications and allow for questions  Note:   Whiteboard updated. Discussed POC with pt. Discussed possible timing of procedure with pt. Pt asked appropriate questions. Pt verbalized understanding       Problem: Pain Management  Goal: Pain level will decrease to patient's comfort goal  Outcome: PROGRESSING AS EXPECTED  Note:   Pt currently resting comfortably. Pt without complaints of acute discomfort. Pt currently at comfort function goal.

## 2019-11-05 NOTE — THERAPY
PT cardiac evaluation ordered. Pt is currently undergoing cardiac work up and is awaiting for cardiac cath. Pt troponin is currently up trending. Will hold until work up is complete and troponin is down trending for cardiac evaluation.

## 2019-11-05 NOTE — PROGRESS NOTES
Patient has a diagnosis of nSTEMI. Notes say angiogram today. No cath report yet.    Echocardiogram shows EF of 45 %. No HF diagnosis has been given.     Below are the defect free care measures that must be met should patient continue with an ACS diagnosis after angiogram.     It is strongly recommended that if the patient gets a stent in cath lab today, that up until 1700 today, the bedside RN start the meds to beds process so that discharge is not delayed. Please do not call the on call pharmacist overnight to start the process unless of course, an order is placed to discharge the patient overnight.     ACS Measures:    1. ASA prescribed at discharge  2. Beta blockade prescribed at discharge, if patient also has HFrEF (EF less than or equal to 40%), this needs to be one of the three evidence based beta blockers: carvedilol, bisoprolol, Toprol XL  3. High intensity statin prescribed at discharge (atorvastatin 40 mg or rosuvastatin 20 mg)  4. ACE-I or ARB prescribed on discharge for LVEF less than 40%  5. Aldosterone blockade prescribed for patients with EF less than 40% AND history of diabetes mellitus OR history of heart failure, heart failure on presentation or heart failure as an in-hospital event  6. ICR referral order is placed  7. Use the Acute Coronary Syndrome discharge instructions to document that patient has been provided with the contact information for ICR   8. Evaluation of LV systolic function can be by angiogram, or echo before discharge, or within past year, cannot be a future plan for LVSF assessment  9. ACS education is documented daily  10. For any patient who receives a stent, initiate meds to beds: Get the outpatient order for the script from the physician, or the APRN:  • Medication  • Dose  • Route  • Quantity, how many pills in the bottle, (ie for Plavix this would be 30, since it’s once daily; Brilinta would be 60 since it’s twice daily)  • Refills, most physicians and APRN’s give 11  refills because the patient usually needs to be on the med for one year  • Weekend: Call [phone]: ask for the on-call Anticoagulation Pharmacist to be paged.   • Weekday: call [phone] (anticoagulation clinic)     What if any of the above ACS Measures are contraindicated?    · Request that the discharging provider document the medication/intervention and the contraindication specifically in a progress note  · For example: “no ACE-I meds due to hypotension” is not enough. It needs to say: “No ACE-I, ARNI, ARB due to hypotension”; “No Beta Blockade due to bradycardia”…

## 2019-11-05 NOTE — PROGRESS NOTES
Report received from previous nurse. Patient encouraged to call for assistance, call light within reach. Bed locked and in lowest position.   Dr. Izquierdo updated on troponin increase to 960. Patient is currently asymptomatic and running a heparin drip. Patient is scheduled for cath in the AM. No new orders received.

## 2019-11-06 ENCOUNTER — APPOINTMENT (OUTPATIENT)
Dept: CARDIOLOGY | Facility: MEDICAL CENTER | Age: 64
DRG: 246 | End: 2019-11-06
Attending: PHYSICIAN ASSISTANT
Payer: MEDICARE

## 2019-11-06 ENCOUNTER — APPOINTMENT (OUTPATIENT)
Dept: RADIOLOGY | Facility: MEDICAL CENTER | Age: 64
DRG: 246 | End: 2019-11-06
Attending: INTERNAL MEDICINE
Payer: MEDICARE

## 2019-11-06 ENCOUNTER — APPOINTMENT (OUTPATIENT)
Dept: CARDIOLOGY | Facility: MEDICAL CENTER | Age: 64
DRG: 246 | End: 2019-11-06
Attending: INTERNAL MEDICINE
Payer: MEDICARE

## 2019-11-06 LAB
ACT BLD: 318 SEC (ref 74–137)
ANION GAP SERPL CALC-SCNC: 7 MMOL/L (ref 0–11.9)
APTT PPP: 33.8 SEC (ref 24.7–36)
BASOPHILS # BLD AUTO: 0.3 % (ref 0–1.8)
BASOPHILS # BLD: 0.03 K/UL (ref 0–0.12)
BUN SERPL-MCNC: 11 MG/DL (ref 8–22)
CALCIUM SERPL-MCNC: 8.5 MG/DL (ref 8.5–10.5)
CHLORIDE SERPL-SCNC: 109 MMOL/L (ref 96–112)
CO2 SERPL-SCNC: 22 MMOL/L (ref 20–33)
CREAT SERPL-MCNC: 0.51 MG/DL (ref 0.5–1.4)
EKG IMPRESSION: NORMAL
EOSINOPHIL # BLD AUTO: 0.07 K/UL (ref 0–0.51)
EOSINOPHIL NFR BLD: 0.6 % (ref 0–6.9)
ERYTHROCYTE [DISTWIDTH] IN BLOOD BY AUTOMATED COUNT: 41.2 FL (ref 35.9–50)
GLUCOSE SERPL-MCNC: 95 MG/DL (ref 65–99)
HCT VFR BLD AUTO: 39.6 % (ref 42–52)
HGB BLD-MCNC: 13.8 G/DL (ref 14–18)
IMM GRANULOCYTES # BLD AUTO: 0.05 K/UL (ref 0–0.11)
IMM GRANULOCYTES NFR BLD AUTO: 0.4 % (ref 0–0.9)
LYMPHOCYTES # BLD AUTO: 2.7 K/UL (ref 1–4.8)
LYMPHOCYTES NFR BLD: 22.7 % (ref 22–41)
MCH RBC QN AUTO: 33.7 PG (ref 27–33)
MCHC RBC AUTO-ENTMCNC: 34.8 G/DL (ref 33.7–35.3)
MCV RBC AUTO: 96.6 FL (ref 81.4–97.8)
MONOCYTES # BLD AUTO: 1.25 K/UL (ref 0–0.85)
MONOCYTES NFR BLD AUTO: 10.5 % (ref 0–13.4)
NEUTROPHILS # BLD AUTO: 7.77 K/UL (ref 1.82–7.42)
NEUTROPHILS NFR BLD: 65.5 % (ref 44–72)
NRBC # BLD AUTO: 0 K/UL
NRBC BLD-RTO: 0 /100 WBC
PLATELET # BLD AUTO: 136 K/UL (ref 164–446)
PMV BLD AUTO: 9.8 FL (ref 9–12.9)
POTASSIUM SERPL-SCNC: 3.4 MMOL/L (ref 3.6–5.5)
RBC # BLD AUTO: 4.1 M/UL (ref 4.7–6.1)
SODIUM SERPL-SCNC: 138 MMOL/L (ref 135–145)
WBC # BLD AUTO: 11.9 K/UL (ref 4.8–10.8)

## 2019-11-06 PROCEDURE — 700101 HCHG RX REV CODE 250

## 2019-11-06 PROCEDURE — 93571 IV DOP VEL&/PRESS C FLO 1ST: CPT | Mod: 26,78 | Performed by: INTERNAL MEDICINE

## 2019-11-06 PROCEDURE — 4A023N7 MEASUREMENT OF CARDIAC SAMPLING AND PRESSURE, LEFT HEART, PERCUTANEOUS APPROACH: ICD-10-PCS | Performed by: INTERNAL MEDICINE

## 2019-11-06 PROCEDURE — 93005 ELECTROCARDIOGRAM TRACING: CPT | Performed by: INTERNAL MEDICINE

## 2019-11-06 PROCEDURE — 700105 HCHG RX REV CODE 258: Performed by: INTERNAL MEDICINE

## 2019-11-06 PROCEDURE — A9270 NON-COVERED ITEM OR SERVICE: HCPCS | Performed by: INTERNAL MEDICINE

## 2019-11-06 PROCEDURE — 93010 ELECTROCARDIOGRAM REPORT: CPT | Mod: 76 | Performed by: INTERNAL MEDICINE

## 2019-11-06 PROCEDURE — 700102 HCHG RX REV CODE 250 W/ 637 OVERRIDE(OP): Performed by: INTERNAL MEDICINE

## 2019-11-06 PROCEDURE — A9270 NON-COVERED ITEM OR SERVICE: HCPCS | Performed by: PHYSICIAN ASSISTANT

## 2019-11-06 PROCEDURE — 99153 MOD SED SAME PHYS/QHP EA: CPT

## 2019-11-06 PROCEDURE — 99152 MOD SED SAME PHYS/QHP 5/>YRS: CPT | Performed by: INTERNAL MEDICINE

## 2019-11-06 PROCEDURE — 71045 X-RAY EXAM CHEST 1 VIEW: CPT

## 2019-11-06 PROCEDURE — 99233 SBSQ HOSP IP/OBS HIGH 50: CPT | Performed by: INTERNAL MEDICINE

## 2019-11-06 PROCEDURE — A9270 NON-COVERED ITEM OR SERVICE: HCPCS | Performed by: HOSPITALIST

## 2019-11-06 PROCEDURE — 4A033BC MEASUREMENT OF ARTERIAL PRESSURE, CORONARY, PERCUTANEOUS APPROACH: ICD-10-PCS | Performed by: INTERNAL MEDICINE

## 2019-11-06 PROCEDURE — B2111ZZ FLUOROSCOPY OF MULTIPLE CORONARY ARTERIES USING LOW OSMOLAR CONTRAST: ICD-10-PCS | Performed by: INTERNAL MEDICINE

## 2019-11-06 PROCEDURE — 700102 HCHG RX REV CODE 250 W/ 637 OVERRIDE(OP): Performed by: PHYSICIAN ASSISTANT

## 2019-11-06 PROCEDURE — 700102 HCHG RX REV CODE 250 W/ 637 OVERRIDE(OP): Performed by: HOSPITALIST

## 2019-11-06 PROCEDURE — 93458 L HRT ARTERY/VENTRICLE ANGIO: CPT | Mod: 26,59 | Performed by: INTERNAL MEDICINE

## 2019-11-06 PROCEDURE — 700111 HCHG RX REV CODE 636 W/ 250 OVERRIDE (IP)

## 2019-11-06 PROCEDURE — 92978 ENDOLUMINL IVUS OCT C 1ST: CPT | Mod: 26,78 | Performed by: INTERNAL MEDICINE

## 2019-11-06 PROCEDURE — 700111 HCHG RX REV CODE 636 W/ 250 OVERRIDE (IP): Performed by: INTERNAL MEDICINE

## 2019-11-06 PROCEDURE — 85730 THROMBOPLASTIN TIME PARTIAL: CPT

## 2019-11-06 PROCEDURE — 93458 L HRT ARTERY/VENTRICLE ANGIO: CPT | Mod: 26,59,78 | Performed by: INTERNAL MEDICINE

## 2019-11-06 PROCEDURE — 700117 HCHG RX CONTRAST REV CODE 255: Performed by: INTERNAL MEDICINE

## 2019-11-06 PROCEDURE — 02703ZZ DILATION OF CORONARY ARTERY, ONE ARTERY, PERCUTANEOUS APPROACH: ICD-10-PCS | Performed by: INTERNAL MEDICINE

## 2019-11-06 PROCEDURE — 85347 COAGULATION TIME ACTIVATED: CPT

## 2019-11-06 PROCEDURE — 92941 PRQ TRLML REVSC TOT OCCL AMI: CPT | Mod: LD | Performed by: INTERNAL MEDICINE

## 2019-11-06 PROCEDURE — 027034Z DILATION OF CORONARY ARTERY, ONE ARTERY WITH DRUG-ELUTING INTRALUMINAL DEVICE, PERCUTANEOUS APPROACH: ICD-10-PCS | Performed by: INTERNAL MEDICINE

## 2019-11-06 PROCEDURE — 80048 BASIC METABOLIC PNL TOTAL CA: CPT

## 2019-11-06 PROCEDURE — 770022 HCHG ROOM/CARE - ICU (200)

## 2019-11-06 PROCEDURE — 85025 COMPLETE CBC W/AUTO DIFF WBC: CPT

## 2019-11-06 PROCEDURE — B240ZZ3 ULTRASONOGRAPHY OF SINGLE CORONARY ARTERY, INTRAVASCULAR: ICD-10-PCS | Performed by: INTERNAL MEDICINE

## 2019-11-06 RX ORDER — LISINOPRIL 5 MG/1
5 TABLET ORAL
Status: DISCONTINUED | OUTPATIENT
Start: 2019-11-06 | End: 2019-11-06

## 2019-11-06 RX ORDER — MIDAZOLAM HYDROCHLORIDE 1 MG/ML
INJECTION INTRAMUSCULAR; INTRAVENOUS
Status: COMPLETED
Start: 2019-11-06 | End: 2019-11-06

## 2019-11-06 RX ORDER — VERAPAMIL HYDROCHLORIDE 2.5 MG/ML
INJECTION, SOLUTION INTRAVENOUS
Status: COMPLETED
Start: 2019-11-06 | End: 2019-11-06

## 2019-11-06 RX ORDER — LISINOPRIL 5 MG/1
5 TABLET ORAL EVERY 12 HOURS
Status: DISCONTINUED | OUTPATIENT
Start: 2019-11-06 | End: 2019-11-06

## 2019-11-06 RX ORDER — LIDOCAINE HYDROCHLORIDE 20 MG/ML
INJECTION, SOLUTION INFILTRATION; PERINEURAL
Status: COMPLETED
Start: 2019-11-06 | End: 2019-11-06

## 2019-11-06 RX ORDER — DOPAMINE HYDROCHLORIDE 160 MG/100ML
INJECTION, SOLUTION INTRAVENOUS
Status: COMPLETED
Start: 2019-11-06 | End: 2019-11-06

## 2019-11-06 RX ORDER — PRASUGREL 10 MG/1
10 TABLET, FILM COATED ORAL DAILY
Qty: 30 TAB | Refills: 11 | Status: SHIPPED | OUTPATIENT
Start: 2019-11-07 | End: 2019-11-08

## 2019-11-06 RX ORDER — POTASSIUM CHLORIDE 20 MEQ/1
40 TABLET, EXTENDED RELEASE ORAL 2 TIMES DAILY
Status: COMPLETED | OUTPATIENT
Start: 2019-11-06 | End: 2019-11-06

## 2019-11-06 RX ORDER — SODIUM CHLORIDE 9 MG/ML
INJECTION, SOLUTION INTRAVENOUS CONTINUOUS
Status: DISCONTINUED | OUTPATIENT
Start: 2019-11-06 | End: 2019-11-07

## 2019-11-06 RX ORDER — HEPARIN SODIUM,PORCINE 1000/ML
VIAL (ML) INJECTION
Status: COMPLETED
Start: 2019-11-06 | End: 2019-11-06

## 2019-11-06 RX ORDER — ADENOSINE 3 MG/ML
INJECTION, SOLUTION INTRAVENOUS
Status: COMPLETED
Start: 2019-11-06 | End: 2019-11-06

## 2019-11-06 RX ORDER — SODIUM CHLORIDE 9 MG/ML
500 INJECTION, SOLUTION INTRAVENOUS ONCE
Status: COMPLETED | OUTPATIENT
Start: 2019-11-06 | End: 2019-11-06

## 2019-11-06 RX ORDER — HEPARIN SODIUM 200 [USP'U]/100ML
INJECTION, SOLUTION INTRAVENOUS
Status: COMPLETED
Start: 2019-11-06 | End: 2019-11-06

## 2019-11-06 RX ORDER — KETOROLAC TROMETHAMINE 30 MG/ML
30 INJECTION, SOLUTION INTRAMUSCULAR; INTRAVENOUS EVERY 6 HOURS
Status: DISPENSED | OUTPATIENT
Start: 2019-11-06 | End: 2019-11-07

## 2019-11-06 RX ORDER — SODIUM CHLORIDE 9 MG/ML
INJECTION, SOLUTION INTRAVENOUS CONTINUOUS
Status: DISPENSED | OUTPATIENT
Start: 2019-11-06 | End: 2019-11-06

## 2019-11-06 RX ADMIN — IOHEXOL 42 ML: 350 INJECTION, SOLUTION INTRAVENOUS at 16:03

## 2019-11-06 RX ADMIN — MIDAZOLAM HYDROCHLORIDE 2 MG: 1 INJECTION, SOLUTION INTRAMUSCULAR; INTRAVENOUS at 15:11

## 2019-11-06 RX ADMIN — IOHEXOL 79 ML: 350 INJECTION, SOLUTION INTRAVENOUS at 06:37

## 2019-11-06 RX ADMIN — KETOROLAC TROMETHAMINE 30 MG: 30 INJECTION, SOLUTION INTRAMUSCULAR; INTRAVENOUS at 17:33

## 2019-11-06 RX ADMIN — LISINOPRIL 5 MG: 5 TABLET ORAL at 09:19

## 2019-11-06 RX ADMIN — NITROGLYCERIN 10 ML: 20 INJECTION INTRAVENOUS at 06:32

## 2019-11-06 RX ADMIN — POTASSIUM CHLORIDE 40 MEQ: 1500 TABLET, EXTENDED RELEASE ORAL at 17:33

## 2019-11-06 RX ADMIN — NITROGLYCERIN 10 ML: 20 INJECTION INTRAVENOUS at 15:10

## 2019-11-06 RX ADMIN — POTASSIUM CHLORIDE 40 MEQ: 1500 TABLET, EXTENDED RELEASE ORAL at 08:15

## 2019-11-06 RX ADMIN — ASPIRIN 81 MG: 81 TABLET, COATED ORAL at 05:14

## 2019-11-06 RX ADMIN — VERAPAMIL HYDROCHLORIDE 2.5 MG: 2.5 INJECTION INTRAVENOUS at 06:33

## 2019-11-06 RX ADMIN — HEPARIN SODIUM 2000 UNITS: 200 INJECTION, SOLUTION INTRAVENOUS at 15:07

## 2019-11-06 RX ADMIN — METOPROLOL TARTRATE 12.5 MG: 25 TABLET, FILM COATED ORAL at 17:33

## 2019-11-06 RX ADMIN — PRASUGREL 10 MG: 10 TABLET, FILM COATED ORAL at 05:16

## 2019-11-06 RX ADMIN — LIDOCAINE HYDROCHLORIDE: 20 INJECTION, SOLUTION INFILTRATION; PERINEURAL at 06:33

## 2019-11-06 RX ADMIN — SODIUM CHLORIDE 500 ML: 9 INJECTION, SOLUTION INTRAVENOUS at 21:22

## 2019-11-06 RX ADMIN — SODIUM CHLORIDE: 9 INJECTION, SOLUTION INTRAVENOUS at 17:15

## 2019-11-06 RX ADMIN — MIDAZOLAM HYDROCHLORIDE 1 MG: 1 INJECTION, SOLUTION INTRAMUSCULAR; INTRAVENOUS at 06:32

## 2019-11-06 RX ADMIN — HEPARIN SODIUM: 1000 INJECTION, SOLUTION INTRAVENOUS; SUBCUTANEOUS at 06:00

## 2019-11-06 RX ADMIN — HEPARIN SODIUM 2000 UNITS: 200 INJECTION, SOLUTION INTRAVENOUS at 06:32

## 2019-11-06 RX ADMIN — LIDOCAINE HYDROCHLORIDE: 20 INJECTION, SOLUTION INFILTRATION; PERINEURAL at 15:10

## 2019-11-06 RX ADMIN — ATORVASTATIN CALCIUM 80 MG: 80 TABLET, FILM COATED ORAL at 17:33

## 2019-11-06 RX ADMIN — THERA TABS 1 TABLET: TAB at 05:14

## 2019-11-06 RX ADMIN — SODIUM CHLORIDE: 9 INJECTION, SOLUTION INTRAVENOUS at 08:15

## 2019-11-06 RX ADMIN — ACETAMINOPHEN 650 MG: 325 TABLET, FILM COATED ORAL at 03:09

## 2019-11-06 RX ADMIN — FENTANYL CITRATE 100 MCG: 50 INJECTION, SOLUTION INTRAMUSCULAR; INTRAVENOUS at 15:43

## 2019-11-06 RX ADMIN — FENTANYL CITRATE 50 MCG: 50 INJECTION, SOLUTION INTRAMUSCULAR; INTRAVENOUS at 06:32

## 2019-11-06 RX ADMIN — MORPHINE SULFATE 2 MG: 4 INJECTION INTRAVENOUS at 17:14

## 2019-11-06 RX ADMIN — VERAPAMIL HYDROCHLORIDE 2.5 MG: 2.5 INJECTION INTRAVENOUS at 15:10

## 2019-11-06 RX ADMIN — ADENOSINE 90 MG: 3 INJECTION, SOLUTION INTRAVENOUS at 15:58

## 2019-11-06 RX ADMIN — BIVALIRUDIN 1.75 MG/KG/HR: 250 INJECTION, POWDER, LYOPHILIZED, FOR SOLUTION INTRAVENOUS at 06:23

## 2019-11-06 RX ADMIN — HEPARIN SODIUM: 1000 INJECTION, SOLUTION INTRAVENOUS; SUBCUTANEOUS at 15:05

## 2019-11-06 RX ADMIN — MIDAZOLAM HYDROCHLORIDE 1.5 MG: 1 INJECTION, SOLUTION INTRAMUSCULAR; INTRAVENOUS at 16:03

## 2019-11-06 RX ADMIN — METOPROLOL TARTRATE 12.5 MG: 25 TABLET, FILM COATED ORAL at 05:15

## 2019-11-06 RX ADMIN — DOPAMINE HYDROCHLORIDE 400000 MCG: 160 INJECTION, SOLUTION INTRAVENOUS at 16:03

## 2019-11-06 ASSESSMENT — ENCOUNTER SYMPTOMS
GASTROINTESTINAL NEGATIVE: 1
COUGH: 0
WEAKNESS: 0
VOMITING: 0
CARDIOVASCULAR NEGATIVE: 1
ABDOMINAL PAIN: 0
CHILLS: 0
NECK PAIN: 1
NEUROLOGICAL NEGATIVE: 1
BLURRED VISION: 0
EYES NEGATIVE: 1
CONSTIPATION: 0
CONSTITUTIONAL NEGATIVE: 1
FEVER: 0
NAUSEA: 0
MUSCULOSKELETAL NEGATIVE: 1
HEADACHES: 0
SHORTNESS OF BREATH: 0
PALPITATIONS: 0
DIZZINESS: 0
NERVOUS/ANXIOUS: 0
DOUBLE VISION: 0
DIARRHEA: 0
RESPIRATORY NEGATIVE: 1
PSYCHIATRIC NEGATIVE: 1
MYALGIAS: 0
BRUISES/BLEEDS EASILY: 0

## 2019-11-06 NOTE — PROGRESS NOTES
Assumed care of patient at bedside report from NOC RN. Updated on POC. Patient currently A & O x 4; on 2 L O2 nasal cannula; up standby assist; without complaints of acute pain. Angiomax running. TR band at 10. Call light within reach. Whiteboard updated. Fall precautions in place. Bed locked and in lowest position. All questions answered. No other needs indicated at this time.

## 2019-11-06 NOTE — CATH LAB
Immediate Post-Operative Note      PreOp Diagnosis:   STEMI activation post PCI earlier today    PostOp Diagnosis:   Widely patent previously placed co-LAD stent  80% hazy appearing proximal stenosis co-LAD  Successful PCI proximal co-LAD (2.25 x 15 mm Turner JIMENA), excellent result    Procedure(s) :  Coronary Angiography, Left Heart Catheterization, Left Ventriculography, PCI LAD    Surgeon(s):  Salo Valle M.D.    Type of Anesthesia: Moderate Sedation    Specimen: None    Complications: None apparent      Salo Valle M.D.  11/6/2019 7:00 AM

## 2019-11-06 NOTE — PROGRESS NOTES
Cardiology x-cover note    I was called regarding new EKG changes. Patient underwent PCI with JIMENA for acutely occluded mLAD which according to angiogram report was laterally displaced. He says post PCI he has been having some discomfort radiating up to his neck and SOB worse with any physical activity. Repeat EKG showing now lateral ST elevations along the precordium and somewhat in inferior leads. Lateral ST elevations are new for him and not seen in any of the prior EKGs and in the territory of the PCI. I am concerned regarding acute stent thrombosis. I discussed with him that it may be better safe than sorry, we can take a repeat look at the stent with coronary angiography. He agrees with plan. Will activate cath lab emergently.    Lionel Fung MD

## 2019-11-06 NOTE — CARE PLAN
Problem: Pain Management  Goal: Pain level will decrease to patient's comfort goal  Outcome: PROGRESSING AS EXPECTED  Intervention: Follow pain managment plan developed in collaboration with patient and Interdisciplinary Team  Note:   Pt with complaints of neck/esophageal discomfort. Cardiology aware. Will continue to monitor.     Problem: Bowel/Gastric:  Goal: Normal bowel function is maintained or improved  Outcome: PROGRESSING SLOWER THAN EXPECTED  Note:   Pt experienced urgency and sudden onset incontinent episode. Will recommend holding stool softeners.

## 2019-11-06 NOTE — PROGRESS NOTES
Pt back on unit with cath lab RN. Monitor room notified.TR band intact. No complaints of pain. Angiomax running. Pt currently SR 81.

## 2019-11-06 NOTE — PROCEDURES
DATE OF SERVICE:  11/05/2019    PROCEDURE:  Cardiac catheterization and percutaneous coronary intervention:  A.  Left heart catheterization.  B.  Left ventriculography.  C.  Selective coronary angiography.  D.  Coronary stent implantation, mid diagonal branch with a 2.0 x 22 mm Resolute Heidrick drug-eluting stent.  E.  Right radial artery approach.  F.  Conscious sedation supervision.    PREPROCEDURE DIAGNOSES:  1.  Non-ST elevation myocardial infarction.  2.  Left ventricular ejection fraction of 45% with anterior apical wall motion   abnormalities.     POSTPROCEDURE DIAGNOSES:  1.  Coronary artery disease with moderate proximal densely calcified left   anterior descending artery, mid diagonal branch occlusion and moderate   posterior descending artery ostial stenosis.  2.  Left ventricular ejection fraction of 45% with anterior apical akinesis.    PHYSICIAN:  Sourav Adler MD.    REFERRING PHYSICIANS:  1.  Becca Manning MD.  2.  Evin Dahl MD.    COMPLICATIONS:  None.    MEDICATIONS:  1.  Versed 2 mg IV.  2.  Fentanyl 200 mcg IV.  3.  Lidocaine 2% subcutaneous.  4.  Heparin intravenous infusion.  5.  Heparin 2000 units IA.  6.  Nitroglycerin 100 mcg IA.  7.  Verapamil 2.5 mg IA.  8.  Heparin 5000 units IV.  9.  Angiomax IV bolus and infusion.    COMPLICATIONS:  None.    DESCRIPTION OF PROCEDURE:  After informed consent was obtained, the patient   was brought to the cardiac catheterization laboratory where he was prepped,   draped, and anesthetized in usual manner.    Using ultrasound guidance in a modified Seldinger technique, a 6-Italian x 10   cm introducer sheath was inserted in the right radial artery.  Heparin,   verapamil, and nitroglycerin were given via the side port.    Next, a 6-Italian JL 3.5 left coronary catheter was inserted in the ostium of   left coronary artery and left coronary angiograms were obtained in various   projections.    Next, a 6-Italian JR 4.0 right coronary catheter  was inserted in the ostium of the   right coronary and right coronary angiograms were obtained in various   projections.    Next, a 4-Trinidadian pigtail catheter was advanced in left ventricle under   fluoroscopic guidance.  A single-plane MENA left ventricular angiogram was   performed.  Pre and postangiogram LVEDP, LV and aortic pressures were   obtained.    Initial angiograms demonstrated apparent anatomy that was difficult to   define of the left anterior descending artery, septal and diagonal system.    There is an extensive septal system, possibly sub-myocardial   LAD with a laterally displaced codominant left anterior descending   artery or diagonal vessel that was 100% occluded.  After reviewing the films   with Dr. Evin Dahl, it was elected to proceed with coronary intervention of the   occluded vessel.    Next, Angiomax was given.    Next, a 6-Trinidadian extra backup 3.5 guiding catheter was inserted in the ostium   of the left coronary artery.    Next, a 0.014  150 wire was negotiated through the midvessel occlusion.    Next, a  1.2x12 mm balloon catheter was inserted across the area of   occlusion and inflated up to 14 atmospheres.    Next, a 2.0x20 mm balloon catheter was inserted across the area of stenosis   and inflated up to 14 atmospheres.    Next, a 2.0 x 22 mm Resolute Johnny drug-eluting stent was placed across the   residual stenosis and deployed at 12 atmospheres with a post-deployment   inflation of 12 atmospheres.  The balloon wire removed and final angiograms   were performed.    At the end the procedure, catheters were removed.  Hemostasis was achieved   with a wrist band device.  Patient tolerated the procedure well.    CONSCIOUS SEDATION SUPERVISION:  I supervised and monitored the administration   of intravenous conscious sedation from 4:16 p.m. until 5:12 p.m.    FINDINGS:  HEMODYNAMICS:  LEFT HEART PRESSURES:  1.  LVEDP 14 mmHg.  2.  Left ventricular systolic pressure 111  mmHg.  3.  Central aortic pressure systolic 113, diastolic 61, mean of 18 mmHg.    LEFT VENTRICULOGRAPHY:  Left ventricular chamber size is normal.  There is   akinesis of the distal anterior wall and apex.  Visually estimated ejection   fraction of 55%.    CORONARY ARTERIOGRAPHY:  1.  Left main artery:  Left main artery is a large caliber vessel,   angiographically normal and bifurcates to the left anterior descending artery   and circumflex artery.  2.  Left anterior descending artery:  The left anterior descending artery   gives rise to an extensive septal system.  The proximal left anterior descending   artery has diffuse circumferential dense calcification with 50% stenosis.  The   remainder of the left anterior descending artery appears sub-myocardial as it   extends to the apex.  The diagonal branch has a mid vessel 100% occlusion.    3.  Circumflex artery:  The circumflex gives rise to 2 major marginal   branches.  The circumflex artery is widely patent with mild focal superficial   atheromatous disease.  4.  Right coronary artery:  The right coronary artery gives rise to a posterior   descending artery in the posterolateral branch.  The right coronary artery has   mild-to-moderate diffuse calcific atheromatous disease.  The ostium of the   posterior descending artery has an estimated 60% stenosis.  The posterior   descending artery extends to the apex.    POST-STENT IMPLANTATION mid diagonal branch, with a 2.0 x 22 mm Resolute   Johnny stent demonstrates good stent expansion, 0% residual stenosis, no   evidence of dissection or thrombus and restoration of SANDRA 3 antegrade flow.    There is moderate diffuse disease prior to the stent.  The diagonal branch is   a long vessel that extends to supplied the lateral wall.    PLAN:  1.  Maximize optimal medical therapy for the patient's coronary artery disease   post-myocardial infarction, post-coronary intervention, as hemodynamically   and clinically tolerated.  2.   Dual antiplatelet therapy with Effient and aspirin.       ____________________________________     MD MCKAYLA GIBSON / MARIUM    DD:  11/05/2019 18:08:33  DT:  11/05/2019 18:34:09    D#:  [id number]  Job#:  [id number]

## 2019-11-06 NOTE — PROGRESS NOTES
Cardiology Follow Up Progress Note    Date of Service  11/6/2019    Attending Physician  Colleen Kapoor M.D.    Chief Complaint   Non-STEMI, coronary artery disease status post stent placement.    SARA Quintanilla is a 64 y.o. male admitted 11/4/2019 with above.    Interim Events  He underwent percutaneous intervention as described.    Review of Systems  Review of Systems   Constitutional: Negative.  Negative for chills and fever.   HENT: Negative.  Negative for hearing loss.    Eyes: Negative.    Respiratory: Negative.  Negative for cough and shortness of breath.    Cardiovascular: Negative.  Negative for chest pain, palpitations and leg swelling.   Gastrointestinal: Negative.  Negative for abdominal pain, nausea and vomiting.   Genitourinary: Negative.  Negative for dysuria and urgency.   Musculoskeletal: Negative.  Negative for myalgias.   Skin: Negative.  Negative for rash.   Neurological: Negative.  Negative for dizziness, weakness and headaches.   Hematological: Does not bruise/bleed easily.   Psychiatric/Behavioral: Negative.  The patient is not nervous/anxious.        Vital signs in last 24 hours  Temp:  [36.4 °C (97.6 °F)-37.3 °C (99.2 °F)] 37.3 °C (99.2 °F)  Pulse:  [68-96] 68  Resp:  [15-18] 17  BP: ()/(61-85) 99/61  SpO2:  [90 %-98 %] 94 %    Physical Exam  Physical Exam  Constitutional:       Appearance: He is well-developed.   HENT:      Head: Normocephalic and atraumatic.   Eyes:      Conjunctiva/sclera: Conjunctivae normal.      Pupils: Pupils are equal, round, and reactive to light.   Neck:      Musculoskeletal: Normal range of motion and neck supple.   Cardiovascular:      Rate and Rhythm: Normal rate and regular rhythm.   Pulmonary:      Effort: Pulmonary effort is normal.      Breath sounds: Normal breath sounds.   Abdominal:      General: Bowel sounds are normal.      Palpations: Abdomen is soft.   Musculoskeletal: Normal range of motion.   Skin:     General: Skin is warm and dry.    Neurological:      Mental Status: He is alert and oriented to person, place, and time.         Lab Review  Lab Results   Component Value Date/Time    WBC 11.9 (H) 11/06/2019 01:35 AM    RBC 4.10 (L) 11/06/2019 01:35 AM    HEMOGLOBIN 13.8 (L) 11/06/2019 01:35 AM    HEMATOCRIT 39.6 (L) 11/06/2019 01:35 AM    MCV 96.6 11/06/2019 01:35 AM    MCH 33.7 (H) 11/06/2019 01:35 AM    MCHC 34.8 11/06/2019 01:35 AM    MPV 9.8 11/06/2019 01:35 AM      Lab Results   Component Value Date/Time    SODIUM 138 11/06/2019 01:35 AM    POTASSIUM 3.4 (L) 11/06/2019 01:35 AM    CHLORIDE 109 11/06/2019 01:35 AM    CO2 22 11/06/2019 01:35 AM    GLUCOSE 95 11/06/2019 01:35 AM    BUN 11 11/06/2019 01:35 AM    CREATININE 0.51 11/06/2019 01:35 AM      Lab Results   Component Value Date/Time    ASTSGOT 85 (H) 11/05/2019 01:25 AM    ALTSGPT 23 11/05/2019 01:25 AM     Lab Results   Component Value Date/Time    CHOLSTRLTOT 161 11/05/2019 01:25 AM     (H) 11/05/2019 01:25 AM    HDL 37 (A) 11/05/2019 01:25 AM    TRIGLYCERIDE 71 11/05/2019 01:25 AM    TROPONINT 966 (H) 11/04/2019 09:37 PM       Recent Labs     11/04/19  0604   NTPROBNP 139*   (Above labs reviewed.)       Current Facility-Administered Medications:   •  NS infusion, , Intravenous, Continuous, Salo Valle M.D.  •  [START ON 11/7/2019] aspirin EC (ECOTRIN) tablet 81 mg, 81 mg, Oral, DAILY, Salo aVlle M.D.  •  prasugrel (EFFIENT) tablet 10 mg, 10 mg, Oral, DAILY, Sourav Adler M.D., 10 mg at 11/06/19 0516  •  senna-docusate (PERICOLACE or SENOKOT S) 8.6-50 MG per tablet 2 Tab, 2 Tab, Oral, BID, 2 Tab at 11/05/19 1809 **AND** polyethylene glycol/lytes (MIRALAX) PACKET 1 Packet, 1 Packet, Oral, QDAY PRN **AND** magnesium hydroxide (MILK OF MAGNESIA) suspension 30 mL, 30 mL, Oral, QDAY PRN **AND** bisacodyl (DULCOLAX) suppository 10 mg, 10 mg, Rectal, QDAY PRN, Julio César Hernandez M.D.  •  Respiratory Care per Protocol, , Nebulization, Continuous RT, Julio César Hernandez M.D.  •   acetaminophen (TYLENOL) tablet 650 mg, 650 mg, Oral, Q6HRS PRN, Julio César Hernandez M.D., 650 mg at 11/06/19 0309  •  metoprolol (LOPRESSOR) tablet 12.5 mg, 12.5 mg, Oral, TWICE DAILY, Julio César Hernandez M.D., 12.5 mg at 11/06/19 0515  •  atorvastatin (LIPITOR) tablet 80 mg, 80 mg, Oral, Q EVENING, Julio César Hernandez M.D., 80 mg at 11/05/19 1809  •  nitroglycerin (NITROSTAT) tablet 0.4 mg, 0.4 mg, Sublingual, Q5 MIN PRN, Julio César Hernandez M.D., 0.4 mg at 11/04/19 2102  •  morphine (pf) 4 MG/ML injection 2-4 mg, 2-4 mg, Intravenous, Q3HRS PRN, Julio César Hernandez M.D., 2 mg at 11/04/19 0609  •  ondansetron (ZOFRAN) syringe/vial injection 4 mg, 4 mg, Intravenous, Q4HRS PRN, Julio César Hernandez M.D.  •  ondansetron (ZOFRAN ODT) dispertab 4 mg, 4 mg, Oral, Q4HRS PRN, Julio César Hernandez M.D.  •  multivitamin (THERAGRAN) tablet 1 Tab, 1 Tab, Oral, DAILY, Juhi Farrell M.D., 1 Tab at 11/06/19 0514    (Medications reviewed.)    Cardiac Imaging and Procedures Review  CARDIAC STUDIES/PROCEDURES:    CARDIAC CATHETERIZATION CONCLUSIONS by Salo Méndez (11/06/19E)  Successful PCI proximal co-LAD (2.25 x 15 mm Ransom JIMENA), excellent result   (study result reviewed)    CARDIAC CATHETERIZATION CONCLUSIONS by Sourav Duggan (11/05/19)  1.  Non-ST elevation myocardial infarction.  Coronary stent implantation, mid diagonal branch with a 2.0 x 22 mm Resolute Johnny drug-eluting stent.   2.  Left ventricular ejection fraction of 45% with anterior apical wall motion abnormalities.   (study result reviewed)    ECHOCARDIOGRAM CONCLUSIONS (11/04/19)  Mildly reduced left ventricular systolic function.  Left ventricular ejection fraction is visually estimated to be 45%.  Akinesis distal septum anteroapical inferoapical walls.  Unable to estimate pulmonary artery pressure due to an inadequate   tricuspid regurgitant jet.  No prior study is available for comparison.   (study result reviewed)    EKG performed on (11/06/19) was reviewed: EKG personally interpreted shows sinus  rhythm with anterior ST elevation.    ASSESSMENT AND PLAN:  1. Coronary artery disease with stent placement: He is clinically doing well without recurrence of his angina.  We will continue with current medical care including aspirin, prasugrel, metoprolol and atorvastatin.  2. Ischemic cardiomyopathy: The overall volume status is adequate. We will continue with current care. We will start ace inhibitor therapy with lisinopril 5 mg BID.      Thank you for allowing me to participate in the care of this patient.  I will continue to follow this patient    Please contact me with any questions.    Evin Dahl M.D.   Cardiologist, [facility]  [phone]

## 2019-11-06 NOTE — PROGRESS NOTES
Hospital Medicine Daily Progress Note    Date of Service  11/5/2019    Chief Complaint  64 y.o. male with a h/o HTN, tobacco abuse admitted 11/4/2019 with NSTEMI    Hospital Course    64 y.o. male with a h/o HTN, tobacco abuse admitted 11/4/2019 with NSTEMI.  Now s/p JIMENA mid left anterior descending artery (radial approach) on 11/5/19.          Interval Problem Update  -Left heart cath today, CAD occluded aberrant laterally displaced mid left anterior descending artery status post drug-eluting stent in mid left anterior descending (via radial artery approach)  - EF 45% with anterior apical akinesis  -At time of exam in the morning patient was without chest pain however had just walked to the bathroom and did have slight twinge in his neck (was on heparin drip    Consultants/Specialty  Cardiology    Code Status  Full    Disposition  Inpatient, TBD    Review of Systems  Review of Systems   Constitutional: Negative for chills, fever and malaise/fatigue.   HENT: Negative.    Eyes: Negative for blurred vision and double vision.   Respiratory: Negative for cough and shortness of breath.    Cardiovascular: Positive for chest pain. Negative for palpitations and leg swelling.   Gastrointestinal: Negative for abdominal pain, constipation, diarrhea, nausea and vomiting.   Genitourinary: Negative for hematuria.   Musculoskeletal: Positive for neck pain.   Skin: Negative for rash.   Neurological: Negative for dizziness and headaches.   Psychiatric/Behavioral: Negative.         Physical Exam  Temp:  [37.1 °C (98.7 °F)-37.6 °C (99.6 °F)] 37.2 °C (98.9 °F)  Pulse:  [59-75] 72  Resp:  [16-18] 18  BP: (124-146)/(68-85) 142/85  SpO2:  [93 %-97 %] 96 %    Physical Exam  Constitutional:       General: He is not in acute distress.     Appearance: Normal appearance. He is not ill-appearing or diaphoretic.   HENT:      Head: Normocephalic and atraumatic.      Nose: Nose normal.      Mouth/Throat:      Mouth: Mucous membranes are moist.       Pharynx: Oropharynx is clear.   Eyes:      General: No scleral icterus.     Conjunctiva/sclera: Conjunctivae normal.   Neck:      Musculoskeletal: Normal range of motion and neck supple.   Cardiovascular:      Rate and Rhythm: Normal rate and regular rhythm.      Pulses: Normal pulses.      Heart sounds: No murmur. No friction rub. No gallop.    Pulmonary:      Effort: Pulmonary effort is normal. No respiratory distress.      Breath sounds: Normal breath sounds. No wheezing, rhonchi or rales.   Abdominal:      General: Abdomen is flat. Bowel sounds are normal. There is no distension.      Palpations: Abdomen is soft.      Tenderness: There is no tenderness.   Musculoskeletal:         General: No swelling.   Lymphadenopathy:      Cervical: No cervical adenopathy.   Skin:     General: Skin is warm and dry.      Coloration: Skin is not jaundiced.   Neurological:      Mental Status: He is alert and oriented to person, place, and time. Mental status is at baseline.   Psychiatric:         Mood and Affect: Mood normal.         Fluids    Intake/Output Summary (Last 24 hours) at 11/5/2019 1902  Last data filed at 11/5/2019 1300  Gross per 24 hour   Intake --   Output 800 ml   Net -800 ml       Laboratory  Recent Labs     11/04/19  1223 11/05/19  0125   WBC 10.7 11.8*   RBC 4.46* 4.21*   HEMOGLOBIN 14.9 14.0   HEMATOCRIT 45.0 40.6*   .9* 96.4   MCH 33.4* 33.3*   MCHC 33.1* 34.5   RDW 44.2 41.3   PLATELETCT 161* 163*   MPV 10.3 9.7     Recent Labs     11/04/19  1223 11/05/19  0125   SODIUM 138 139   POTASSIUM 4.0 3.9   CHLORIDE 109 109   CO2 24 22   GLUCOSE 96 113*   BUN 9 11   CREATININE 0.68 0.66   CALCIUM 8.7 8.5     Recent Labs     11/04/19  0604 11/04/19  1223 11/04/19  1828 11/05/19  0125   APTT 41.7* 74.6* 74.2* 75.3*   INR 1.09  --   --   --          Recent Labs     11/05/19  0125   TRIGLYCERIDE 71   HDL 37*   *       Imaging  OUTSIDE IMAGES-DX CHEST   Final Result      EC-ECHOCARDIOGRAM COMPLETE W/O  CONT   Final Result      CL-LEFT HEART CATHETERIZATION WITH POSSIBLE INTERVENTION    (Results Pending)        Assessment/Plan  NSTEMI (non-ST elevated myocardial infarction) (HCC)- (present on admission)  Assessment & Plan  Patient has been given full dose of aspirin, s/p heparin gtt  - metoprolol 12.5 mg twice daily with holding parameters  - atorvastatin 80 mg daily  - s/p lipid panel, TSH and hemoglobin A1c  -Cardiology following, appreciate recs  -- Post PCI Angiomax x4 hours  -- DAPT Effient and aspirin.      Acute systolic heart failure (HCC)- (present on admission)  Assessment & Plan  - EF 45%  - started on BB  - if BP uncontrolled consider ACEi/ARB    Essential hypertension- (present on admission)  Assessment & Plan  Uncontrolled  - metoprolol 12.5 mg twice daily    Tobacco abuse- (present on admission)  Assessment & Plan  Tobacco cessation education provided for more than 11 minutes, discussed options of nicotine patch, medical treatment with wellbutrin and chantix. Discussed the risks of smoking including increased risk of heart disease, stroke, cancer and COPD. Discussed the benefits of quitting smoking.  Patient states he will quit smoking.  We will avoid nicotine replacement in the setting of ACS         VTE prophylaxis: SCDs tonight then lovenox/heparin in am

## 2019-11-06 NOTE — DISCHARGE PLANNING
1220 - Called [facility] in [place], [place] to price check Effient Rx for pt, copay is $1.25 monthly, bedside nurse Racquel villalta.

## 2019-11-06 NOTE — CATH LAB
Immediate Post-Operative Note      PreOp Diagnosis:  N STEMI  Abnormal echocardiogram with anterior apical akinesis.    PostOp Diagnosis:  CAD occluded aberrant laterally displaced mid left anterior descending artery  Left ventricular ejection fraction 45% with anterior apical akinesis.    Procedure(s) :  Coronary Angiography, Left Heart Catheterization, Left Ventriculography  PCI mid left anterior descending artery 2.0 x 22 mm Resolute Johnny JIMENA.  Right radial artery approach.    Surgeon(s):  Sourav Adler M.D.    Type of Anesthesia: Moderate Sedation    Specimen: None    Complications: None    Plan  Post PCI Angiomax x4 hours  DAPT Effient and aspirin.  Maximize guideline directed medical therapy for CAD, post MI as hemodynamically clinically tolerated.      Sourav Adler M.D.  11/5/2019 5:19 PM

## 2019-11-06 NOTE — PROGRESS NOTES
Dr. SILVIA Fung at the bedside, notified of continued ST elevation on AM EKG, patient c/o mild upper chest/throat tightness since arrival post-procedure yesterday evening. Treated with tylenol, patient states discomfort especially when deep breathing and 'tightness in lymphnodes' but no increase in pain since arrival to unit. MD reviewed AM EKG and notes worsening ST elevation. Noted that patient got AM ASA and effient very recently. Dr. Fung requests Code STEMI called, patient hooked to defib pads, prepped for cath lab, and taken to cath lab with CIC charge.

## 2019-11-06 NOTE — THERAPY
Physical Therapy Contact Note    PT cardiac rehab order received, pt now post repeat cath with another JIMENA; will follow up when appropriate for PT evaluation;    Ana M Gray, PT, DPT Pager: [phone]

## 2019-11-07 LAB
ANION GAP SERPL CALC-SCNC: 7 MMOL/L (ref 0–11.9)
BASOPHILS # BLD AUTO: 0.3 % (ref 0–1.8)
BASOPHILS # BLD: 0.03 K/UL (ref 0–0.12)
BUN SERPL-MCNC: 12 MG/DL (ref 8–22)
CALCIUM SERPL-MCNC: 7.1 MG/DL (ref 8.5–10.5)
CHLORIDE SERPL-SCNC: 115 MMOL/L (ref 96–112)
CO2 SERPL-SCNC: 18 MMOL/L (ref 20–33)
CREAT SERPL-MCNC: 0.61 MG/DL (ref 0.5–1.4)
EOSINOPHIL # BLD AUTO: 0.04 K/UL (ref 0–0.51)
EOSINOPHIL NFR BLD: 0.4 % (ref 0–6.9)
ERYTHROCYTE [DISTWIDTH] IN BLOOD BY AUTOMATED COUNT: 44.7 FL (ref 35.9–50)
GLUCOSE SERPL-MCNC: 95 MG/DL (ref 65–99)
HCT VFR BLD AUTO: 32.7 % (ref 42–52)
HGB BLD-MCNC: 11.1 G/DL (ref 14–18)
IMM GRANULOCYTES # BLD AUTO: 0.03 K/UL (ref 0–0.11)
IMM GRANULOCYTES NFR BLD AUTO: 0.3 % (ref 0–0.9)
LYMPHOCYTES # BLD AUTO: 1.6 K/UL (ref 1–4.8)
LYMPHOCYTES NFR BLD: 17.9 % (ref 22–41)
MCH RBC QN AUTO: 33.3 PG (ref 27–33)
MCHC RBC AUTO-ENTMCNC: 32.7 G/DL (ref 33.7–35.3)
MCV RBC AUTO: 101.8 FL (ref 81.4–97.8)
MONOCYTES # BLD AUTO: 0.84 K/UL (ref 0–0.85)
MONOCYTES NFR BLD AUTO: 9.4 % (ref 0–13.4)
NEUTROPHILS # BLD AUTO: 6.4 K/UL (ref 1.82–7.42)
NEUTROPHILS NFR BLD: 71.7 % (ref 44–72)
NRBC # BLD AUTO: 0 K/UL
NRBC BLD-RTO: 0 /100 WBC
PLATELET # BLD AUTO: 116 K/UL (ref 164–446)
PMV BLD AUTO: 10.2 FL (ref 9–12.9)
POTASSIUM SERPL-SCNC: 4.1 MMOL/L (ref 3.6–5.5)
RBC # BLD AUTO: 3.27 M/UL (ref 4.7–6.1)
SODIUM SERPL-SCNC: 140 MMOL/L (ref 135–145)
WBC # BLD AUTO: 8.9 K/UL (ref 4.8–10.8)

## 2019-11-07 PROCEDURE — 700105 HCHG RX REV CODE 258: Performed by: HOSPITALIST

## 2019-11-07 PROCEDURE — 80048 BASIC METABOLIC PNL TOTAL CA: CPT

## 2019-11-07 PROCEDURE — 99232 SBSQ HOSP IP/OBS MODERATE 35: CPT | Performed by: HOSPITALIST

## 2019-11-07 PROCEDURE — 700102 HCHG RX REV CODE 250 W/ 637 OVERRIDE(OP): Performed by: INTERNAL MEDICINE

## 2019-11-07 PROCEDURE — A9270 NON-COVERED ITEM OR SERVICE: HCPCS | Performed by: INTERNAL MEDICINE

## 2019-11-07 PROCEDURE — 85025 COMPLETE CBC W/AUTO DIFF WBC: CPT

## 2019-11-07 PROCEDURE — 700111 HCHG RX REV CODE 636 W/ 250 OVERRIDE (IP): Performed by: INTERNAL MEDICINE

## 2019-11-07 PROCEDURE — 700102 HCHG RX REV CODE 250 W/ 637 OVERRIDE(OP): Performed by: HOSPITALIST

## 2019-11-07 PROCEDURE — 770020 HCHG ROOM/CARE - TELE (206)

## 2019-11-07 PROCEDURE — A9270 NON-COVERED ITEM OR SERVICE: HCPCS | Performed by: HOSPITALIST

## 2019-11-07 PROCEDURE — 99232 SBSQ HOSP IP/OBS MODERATE 35: CPT | Performed by: INTERNAL MEDICINE

## 2019-11-07 RX ORDER — SODIUM CHLORIDE 9 MG/ML
500 INJECTION, SOLUTION INTRAVENOUS ONCE
Status: COMPLETED | OUTPATIENT
Start: 2019-11-07 | End: 2019-11-07

## 2019-11-07 RX ADMIN — ATORVASTATIN CALCIUM 80 MG: 80 TABLET, FILM COATED ORAL at 17:08

## 2019-11-07 RX ADMIN — ACETAMINOPHEN 650 MG: 325 TABLET, FILM COATED ORAL at 20:45

## 2019-11-07 RX ADMIN — PRASUGREL 10 MG: 10 TABLET, FILM COATED ORAL at 05:22

## 2019-11-07 RX ADMIN — SODIUM CHLORIDE 500 ML: 9 INJECTION, SOLUTION INTRAVENOUS at 02:19

## 2019-11-07 RX ADMIN — THERA TABS 1 TABLET: TAB at 05:22

## 2019-11-07 RX ADMIN — ACETAMINOPHEN 650 MG: 325 TABLET, FILM COATED ORAL at 14:26

## 2019-11-07 RX ADMIN — ASPIRIN 81 MG: 81 TABLET, COATED ORAL at 05:22

## 2019-11-07 RX ADMIN — KETOROLAC TROMETHAMINE 30 MG: 30 INJECTION, SOLUTION INTRAMUSCULAR; INTRAVENOUS at 00:30

## 2019-11-07 ASSESSMENT — ENCOUNTER SYMPTOMS
SHORTNESS OF BREATH: 0
ABDOMINAL PAIN: 0
COUGH: 0
VOMITING: 0
FEVER: 0
EYES NEGATIVE: 1
HEADACHES: 0
NERVOUS/ANXIOUS: 0
DIARRHEA: 0
GASTROINTESTINAL NEGATIVE: 1
CHILLS: 0
NEUROLOGICAL NEGATIVE: 1
CARDIOVASCULAR NEGATIVE: 1
BRUISES/BLEEDS EASILY: 0
CONSTITUTIONAL NEGATIVE: 1
PALPITATIONS: 0
NAUSEA: 0
BLURRED VISION: 0
MUSCULOSKELETAL NEGATIVE: 1
PSYCHIATRIC NEGATIVE: 1
DOUBLE VISION: 0
SORE THROAT: 0
WEAKNESS: 0
MYALGIAS: 0
DIZZINESS: 0
RESPIRATORY NEGATIVE: 1
BACK PAIN: 0
LOSS OF CONSCIOUSNESS: 0

## 2019-11-07 NOTE — CARE PLAN
Problem: Infection  Goal: Will remain free from infection  Outcome: PROGRESSING AS EXPECTED     Problem: Bowel/Gastric:  Goal: Will not experience complications related to bowel motility  Outcome: PROGRESSING AS EXPECTED     Problem: Discharge Barriers/Planning  Goal: Patient's continuum of care needs will be met  Outcome: PROGRESSING AS EXPECTED     Problem: Pain Management  Goal: Pain level will decrease to patient's comfort goal  Outcome: PROGRESSING AS EXPECTED

## 2019-11-07 NOTE — PROGRESS NOTES
1950: Updated Dr. Junior - pt became hypotensive around 1830 with SBPs 70-80s. Pt asymptomatic. Metoprolol was given 1730. MD to evaluate patient. Will continue to monitor.

## 2019-11-07 NOTE — DIETARY
Nutrition Services:    Cardiac Rehab diet education consult received. Cardiac nutrition education completed on 11/5. See education tab.     RD available prn.

## 2019-11-07 NOTE — DISCHARGE PLANNING
Care Transition Team Discharge Planning    Anticipated Discharge Disposition: d/c home w/ meds    Action: Lsw called pt's preferred pharmacy, [facility] [place] @ [phone].    The out of pocket cost for Effient is $1.25.     Barriers to Discharge: TBD    Plan: f/u w/ medical team

## 2019-11-07 NOTE — PROGRESS NOTES
1700-  Patient experiencing similar Chest Pain as prior to cath lab.  EKG obtained and reviewed.  Cardiology paged and updated on EKG results, patient complaints and overall condition.  Orders put in by MD.  Aware of ST elevation.

## 2019-11-07 NOTE — PROGRESS NOTES
"[facility] Medicine Daily Progress Note    Date of Service  11/7/2019    Chief Complaint  64 y.o. male admitted 11/4/2019 with NSTEMI    Hospital Course    PMHx HTN, Tobacco abuse.  Admitted with CP and went to cath lab on 11/5.  There was treated with an JIMENA to the LAD.  Was transferred back to the floor.  Later on 11/6 pt developed re-occurence of his neck and CP.  Repeat EKG showinig worsening ST segment elevation and pt taken emergently back to the cath lab.  Repeat angiography however did not show any restenosis.        Interval Problem Update  No more chest \"pressure\" overnight  Sinus 70-80s  SBPs low and given 1,000ml bolus  Tolerating po  AFebrile  UOP >1,000ml/12hrs    Consultants/Specialty  cardiology    Code Status  full    Disposition  OK to Tele    Review of Systems  Review of Systems   Constitutional: Negative for chills and fever.   HENT: Negative for nosebleeds and sore throat.    Eyes: Negative for blurred vision and double vision.   Respiratory: Negative for cough and shortness of breath.    Cardiovascular: Negative for chest pain, palpitations and leg swelling.   Gastrointestinal: Negative for abdominal pain, diarrhea, nausea and vomiting.   Genitourinary: Negative for dysuria and urgency.   Musculoskeletal: Negative for back pain.   Skin: Negative for rash.   Neurological: Negative for dizziness, loss of consciousness and headaches.        Physical Exam  Temp:  [36.6 °C (97.8 °F)-37.3 °C (99.2 °F)] 36.7 °C (98 °F)  Pulse:  [61-87] 77  Resp:  [14-24] 19  BP: ()/() 90/48  SpO2:  [91 %-100 %] 91 %    Physical Exam  Constitutional:       General: He is not in acute distress.     Appearance: He is well-developed and underweight. He is not diaphoretic.   HENT:      Head: Normocephalic and atraumatic.   Eyes:      Conjunctiva/sclera: Conjunctivae normal.   Neck:      Vascular: No JVD.   Cardiovascular:      Rate and Rhythm: Normal rate.      Heart sounds: No murmur. No gallop.    Pulmonary:    "   Effort: Pulmonary effort is normal. No respiratory distress.      Breath sounds: No stridor. No wheezing or rales.   Abdominal:      Palpations: Abdomen is soft.      Tenderness: There is no tenderness. There is no guarding or rebound.   Skin:     General: Skin is warm and dry.      Findings: No rash.   Neurological:      Mental Status: He is oriented to person, place, and time.   Psychiatric:         Thought Content: Thought content normal.         Fluids    Intake/Output Summary (Last 24 hours) at 11/7/2019 0534  Last data filed at 11/7/2019 0400  Gross per 24 hour   Intake 2262.5 ml   Output 1100 ml   Net 1162.5 ml       Laboratory  Recent Labs     11/04/19  1223 11/05/19  0125 11/06/19 0135   WBC 10.7 11.8* 11.9*   RBC 4.46* 4.21* 4.10*   HEMOGLOBIN 14.9 14.0 13.8*   HEMATOCRIT 45.0 40.6* 39.6*   .9* 96.4 96.6   MCH 33.4* 33.3* 33.7*   MCHC 33.1* 34.5 34.8   RDW 44.2 41.3 41.2   PLATELETCT 161* 163* 136*   MPV 10.3 9.7 9.8     Recent Labs     11/04/19  1223 11/05/19 0125 11/06/19 0135   SODIUM 138 139 138   POTASSIUM 4.0 3.9 3.4*   CHLORIDE 109 109 109   CO2 24 22 22   GLUCOSE 96 113* 95   BUN 9 11 11   CREATININE 0.68 0.66 0.51   CALCIUM 8.7 8.5 8.5     Recent Labs     11/04/19  0604  11/04/19  1828 11/05/19 0125 11/06/19 0135   APTT 41.7*   < > 74.2* 75.3* 33.8   INR 1.09  --   --   --   --     < > = values in this interval not displayed.         Recent Labs     11/05/19 0125   TRIGLYCERIDE 71   HDL 37*   *       Imaging  CL-LEFT HEART CATHETERIZATION WITH POSSIBLE INTERVENTION   Final Result      CL-LEFT HEART CATHETERIZATION WITH POSSIBLE INTERVENTION   Final Result      DX-CHEST-LIMITED (1 VIEW)   Final Result         1.  No acute cardiopulmonary disease.      OUTSIDE IMAGES-DX CHEST   Final Result      EC-ECHOCARDIOGRAM COMPLETE W/O CONT   Final Result      CL-LEFT HEART CATHETERIZATION WITH POSSIBLE INTERVENTION    (Results Pending)        Assessment/Plan  NSTEMI (non-ST elevated  myocardial infarction) (HCC)- (present on admission)  Assessment & Plan  Patient has been given full dose of aspirin, s/p heparin gtt  - s/p JIMENA x 2 (co-LAD x 2)  - metoprolol 12.5 mg twice daily with holding parameters  - atorvastatin 80 mg daily  - s/p lipid panel, TSH and hemoglobin A1c  -Cardiology following, appreciate recs  -- DAPT Effient and aspirin.  Recurrent pain with EKG changes leading to repeat cath which was neg for re-stenosis      Acute systolic heart failure (HCC)- (present on admission)  Assessment & Plan  - EF 45%  - started on BB  Pressures to low to allow ACEI at present  Compensated  ICM    Essential hypertension- (present on admission)  Assessment & Plan  Uncontrolled  - metoprolol 12.5 mg twice daily    Tobacco abuse- (present on admission)  Assessment & Plan  Pt counseled x 5 mins as part of his lifestyles changes for his CAD         VTE prophylaxis: ambulatory

## 2019-11-07 NOTE — PROGRESS NOTES
Cardiology Follow Up Progress Note    Date of Service  11/7/2019    Attending Physician  Mir Warren M.D.    Chief Complaint   Non-STEMI, coronary artery disease status post stent placement, ischemic cardiomyopathy.     SARA Quintanilla is a 64 y.o. male admitted 11/4/2019 with above.    Interim Events  No significant changes noted from cardiac standpoint within the past 24 hours.    Review of Systems  Review of Systems   Constitutional: Negative.  Negative for chills and fever.   HENT: Negative.  Negative for hearing loss.    Eyes: Negative.    Respiratory: Negative.  Negative for cough and shortness of breath.    Cardiovascular: Negative.  Negative for chest pain, palpitations and leg swelling.   Gastrointestinal: Negative.  Negative for abdominal pain, nausea and vomiting.   Genitourinary: Negative.  Negative for dysuria and urgency.   Musculoskeletal: Negative.  Negative for myalgias.   Skin: Negative.  Negative for rash.   Neurological: Negative.  Negative for dizziness, weakness and headaches.   Hematological: Does not bruise/bleed easily.   Psychiatric/Behavioral: Negative.  The patient is not nervous/anxious.        Vital signs in last 24 hours  Temp:  [36.6 °C (97.8 °F)-37.3 °C (99.2 °F)] 36.7 °C (98 °F)  Pulse:  [61-87] 81  Resp:  [14-24] 19  BP: ()/() 91/59  SpO2:  [91 %-100 %] 95 %    Physical Exam  Physical Exam  Constitutional:       Appearance: He is well-developed.   HENT:      Head: Normocephalic and atraumatic.   Eyes:      Conjunctiva/sclera: Conjunctivae normal.      Pupils: Pupils are equal, round, and reactive to light.   Neck:      Musculoskeletal: Normal range of motion and neck supple.   Cardiovascular:      Rate and Rhythm: Normal rate and regular rhythm.   Pulmonary:      Effort: Pulmonary effort is normal.      Breath sounds: Normal breath sounds.   Abdominal:      General: Bowel sounds are normal.      Palpations: Abdomen is soft.   Musculoskeletal: Normal range of  motion.   Skin:     General: Skin is warm and dry.   Neurological:      Mental Status: He is alert and oriented to person, place, and time.         Lab Review  Lab Results   Component Value Date/Time    WBC 8.9 11/07/2019 05:27 AM    RBC 3.27 (L) 11/07/2019 05:27 AM    HEMOGLOBIN 11.1 (L) 11/07/2019 05:27 AM    HEMATOCRIT 32.7 (L) 11/07/2019 05:27 AM    .8 (H) 11/07/2019 05:27 AM    MCH 33.3 (H) 11/07/2019 05:27 AM    MCHC 32.7 (L) 11/07/2019 05:27 AM    MPV 10.2 11/07/2019 05:27 AM      Lab Results   Component Value Date/Time    SODIUM 140 11/07/2019 05:27 AM    POTASSIUM 4.1 11/07/2019 05:27 AM    CHLORIDE 115 (H) 11/07/2019 05:27 AM    CO2 18 (L) 11/07/2019 05:27 AM    GLUCOSE 95 11/07/2019 05:27 AM    BUN 12 11/07/2019 05:27 AM    CREATININE 0.61 11/07/2019 05:27 AM      Lab Results   Component Value Date/Time    ASTSGOT 85 (H) 11/05/2019 01:25 AM    ALTSGPT 23 11/05/2019 01:25 AM     Lab Results   Component Value Date/Time    CHOLSTRLTOT 161 11/05/2019 01:25 AM     (H) 11/05/2019 01:25 AM    HDL 37 (A) 11/05/2019 01:25 AM    TRIGLYCERIDE 71 11/05/2019 01:25 AM    TROPONINT 966 (H) 11/04/2019 09:37 PM       No results for input(s): NTPROBNP in the last 72 hours.  (Above labs reviewed.)       Current Facility-Administered Medications:   •  aspirin EC (ECOTRIN) tablet 81 mg, 81 mg, Oral, DAILY, Salo Valle M.D., 81 mg at 11/07/19 0522  •  NS infusion, , Intravenous, Continuous, Mir Warren M.D., Stopped at 11/06/19 2115  •  ketorolac (TORADOL) injection 30 mg, 30 mg, Intravenous, Q6HRS, Sourav Adler M.D., 30 mg at 11/07/19 0030  •  prasugrel (EFFIENT) tablet 10 mg, 10 mg, Oral, DAILY, Sourav Adler M.D., 10 mg at 11/07/19 0522  •  senna-docusate (PERICOLACE or SENOKOT S) 8.6-50 MG per tablet 2 Tab, 2 Tab, Oral, BID, Stopped at 11/06/19 1527 **AND** polyethylene glycol/lytes (MIRALAX) PACKET 1 Packet, 1 Packet, Oral, QDAY PRN **AND** magnesium hydroxide (MILK OF MAGNESIA)  suspension 30 mL, 30 mL, Oral, QDAY PRN **AND** bisacodyl (DULCOLAX) suppository 10 mg, 10 mg, Rectal, QDAY PRN, Julio César Hernandez M.D.  •  Respiratory Care per Protocol, , Nebulization, Continuous RT, Julio César Hernandez M.D.  •  acetaminophen (TYLENOL) tablet 650 mg, 650 mg, Oral, Q6HRS PRN, Julio César Hernandez M.D., 650 mg at 11/06/19 0309  •  atorvastatin (LIPITOR) tablet 80 mg, 80 mg, Oral, Q EVENING, Julio César Hernandez M.D., 80 mg at 11/06/19 1733  •  nitroglycerin (NITROSTAT) tablet 0.4 mg, 0.4 mg, Sublingual, Q5 MIN PRN, Julio César Hernandez M.D., 0.4 mg at 11/04/19 2102  •  morphine (pf) 4 MG/ML injection 2-4 mg, 2-4 mg, Intravenous, Q3HRS PRN, Julio César Hernandez M.D., 2 mg at 11/06/19 1714  •  ondansetron (ZOFRAN) syringe/vial injection 4 mg, 4 mg, Intravenous, Q4HRS PRN, Julio César Hernandez M.D.  •  ondansetron (ZOFRAN ODT) dispertab 4 mg, 4 mg, Oral, Q4HRS PRN, Julio César Hernandez M.D.  •  multivitamin (THERAGRAN) tablet 1 Tab, 1 Tab, Oral, DAILY, Juhi Farrell M.D., 1 Tab at 11/07/19 0522  (Medications reviewed.)    Cardiac Imaging and Procedures Review    CARDIAC STUDIES/PROCEDURES:    CARDIAC CATHETERIZATION CONCLUSIONS by Mir June (11/06/19)  Conclusions  1. Noncoronary etiology of chest pain.  2. Widely patent stent in the diagonal vessel.  3. Moderate stenosis of the proximal LAD, which is heavily calcified but  non-flow-limiting by FFR (0.89).  4. Normal LVEDP.  (study result reviewed)     CARDIAC CATHETERIZATION CONCLUSIONS by Salo Méndez (11/06/19E)  Successful PCI proximal co-LAD (2.25 x 15 mm [place] JIMENA), excellent result      CARDIAC CATHETERIZATION CONCLUSIONS by Sourav Duggan (11/05/19)  1.  Non-ST elevation myocardial infarction.  Coronary stent implantation, mid diagonal branch with a 2.0 x 22 mm Resolute Kingston drug-eluting stent.   2.  Left ventricular ejection fraction of 45% with anterior apical wall motion abnormalities.      ECHOCARDIOGRAM CONCLUSIONS (11/04/19)  Mildly reduced left ventricular systolic  function.  Left ventricular ejection fraction is visually estimated to be 45%.  Akinesis distal septum anteroapical inferoapical walls.  Unable to estimate pulmonary artery pressure due to an inadequate   tricuspid regurgitant jet.  No prior study is available for comparison.      EKG performed on (11/07/19) was reviewed: EKG personally interpreted shows sinus rhythm with anterior Q waves.  EKG performed on (11/06/19) was reviewed: EKG personally interpreted shows sinus rhythm with anterior ST elevation.    Assessment/Plan    1. Coronary artery disease with stent placement: He is clinically doing well without recurrence of his angina.  We will continue with current medical care including aspirin, prasugrel and atorvastatin. We will hold beta blockade therapy and ace inhibitor therapy due to low blood pressure.  2. Ischemic cardiomyopathy: The overall volume status is adequate. We will continue with current care      Thank you for allowing me to participate in the care of this patient.  I will continue to follow this patient    Please contact me with any questions.    Evin Dahl M.D.   Cardiologist, [facility]  [phone]

## 2019-11-07 NOTE — PROGRESS NOTES
Updated Dr. Figueredo on continued hypotension. Orders for 500cc bolus, BP meds to be discontinued, and to update cardiology.

## 2019-11-07 NOTE — PROGRESS NOTES
[facility] Medicine Daily Progress Note    Date of Service  11/6/2019    Chief Complaint  64 y.o. male with a h/o HTN, tobacco abuse admitted 11/4/2019 with NSTEMI    Hospital Course    64 y.o. male with a h/o HTN, tobacco abuse admitted 11/4/2019 with NSTEMI.  Now s/p JIMENA mid left anterior descending artery (radial approach) on 11/5/19.          Interval Problem Update  -This morning patient developed chest and neck discomfort, EKG showed ST elevations concerning for stent thrombosis so patient was taken back to the Cath Lab which showed a widely patent previously placed toe-LAD stent however 80% hazy appearing proximal stenosis code-LAD and status post another drug-eluting stent with excellent result.  - When I saw patient this morning after that procedure he reported that he initially felt improved however as we are talking patient developed increasing neck discomfort that he had previously, EKG was obtained which again showed worsening ST elevations patient was taken back to the Cath Lab however stents were patent and without further significant stenosis.  Postprocedure diagnosis: Suspected post infarct pericarditis.  Patient then transferred to [facility]    Consultants/Specialty  Cardiology    Code Status  Full    Disposition  Inpatient, TBD    Review of Systems  Review of Systems   Constitutional: Negative for chills, fever and malaise/fatigue.   HENT: Negative.    Eyes: Negative for blurred vision and double vision.   Respiratory: Negative for cough and shortness of breath.    Cardiovascular: Positive for chest pain. Negative for palpitations and leg swelling.   Gastrointestinal: Negative for abdominal pain, constipation, diarrhea, nausea and vomiting.   Genitourinary: Negative for hematuria.   Musculoskeletal: Positive for neck pain.   Skin: Negative for rash.   Neurological: Negative for dizziness and headaches.   Psychiatric/Behavioral: Negative.         Physical Exam  Temp:  [36.4 °C (97.6 °F)-37.3 °C (99.2 °F)]  36.6 °C (97.9 °F)  Pulse:  [61-96] 82  Resp:  [14-18] 14  BP: ()/(57-82) 103/57  SpO2:  [90 %-100 %] 95 %    Physical Exam  Constitutional:       General: He is not in acute distress.     Appearance: Normal appearance. He is not ill-appearing or diaphoretic.   HENT:      Head: Normocephalic and atraumatic.      Nose: Nose normal.      Mouth/Throat:      Mouth: Mucous membranes are moist.      Pharynx: Oropharynx is clear.   Eyes:      General: No scleral icterus.     Conjunctiva/sclera: Conjunctivae normal.   Neck:      Musculoskeletal: Normal range of motion and neck supple.   Cardiovascular:      Rate and Rhythm: Normal rate and regular rhythm.      Pulses: Normal pulses.      Heart sounds: Heart sounds are distant. No murmur. No friction rub. No gallop.       Comments: Distant heart sounds heard along the right and left upper sternal border, normal heart sounds left lower sternal border, did not appreciate rub  Pulmonary:      Effort: Pulmonary effort is normal. No respiratory distress.      Breath sounds: Normal breath sounds. No wheezing, rhonchi or rales.   Abdominal:      General: Abdomen is flat. Bowel sounds are normal. There is no distension.      Palpations: Abdomen is soft.      Tenderness: There is no tenderness.   Musculoskeletal:         General: No swelling.   Lymphadenopathy:      Cervical: No cervical adenopathy.   Skin:     General: Skin is warm and dry.      Coloration: Skin is not jaundiced.   Neurological:      Mental Status: He is alert and oriented to person, place, and time. Mental status is at baseline.   Psychiatric:         Mood and Affect: Mood normal.         Fluids    Intake/Output Summary (Last 24 hours) at 11/6/2019 1731  Last data filed at 11/6/2019 1259  Gross per 24 hour   Intake 1462.5 ml   Output 1175 ml   Net 287.5 ml       Laboratory  Recent Labs     11/04/19  1223 11/05/19  0125 11/06/19  0135   WBC 10.7 11.8* 11.9*   RBC 4.46* 4.21* 4.10*   HEMOGLOBIN 14.9 14.0 13.8*    HEMATOCRIT 45.0 40.6* 39.6*   .9* 96.4 96.6   MCH 33.4* 33.3* 33.7*   MCHC 33.1* 34.5 34.8   RDW 44.2 41.3 41.2   PLATELETCT 161* 163* 136*   MPV 10.3 9.7 9.8     Recent Labs     11/04/19  1223 11/05/19  0125 11/06/19  0135   SODIUM 138 139 138   POTASSIUM 4.0 3.9 3.4*   CHLORIDE 109 109 109   CO2 24 22 22   GLUCOSE 96 113* 95   BUN 9 11 11   CREATININE 0.68 0.66 0.51   CALCIUM 8.7 8.5 8.5     Recent Labs     11/04/19  0604  11/04/19  1828 11/05/19  0125 11/06/19  0135   APTT 41.7*   < > 74.2* 75.3* 33.8   INR 1.09  --   --   --   --     < > = values in this interval not displayed.         Recent Labs     11/05/19 0125   TRIGLYCERIDE 71   HDL 37*   *       Imaging  CL-LEFT HEART CATHETERIZATION WITH POSSIBLE INTERVENTION   Final Result      CL-LEFT HEART CATHETERIZATION WITH POSSIBLE INTERVENTION   Final Result      DX-CHEST-LIMITED (1 VIEW)   Final Result         1.  No acute cardiopulmonary disease.      OUTSIDE IMAGES-DX CHEST   Final Result      EC-ECHOCARDIOGRAM COMPLETE W/O CONT   Final Result      CL-LEFT HEART CATHETERIZATION WITH POSSIBLE INTERVENTION    (Results Pending)        Assessment/Plan  NSTEMI (non-ST elevated myocardial infarction) (HCC)- (present on admission)  Assessment & Plan  Patient has been given full dose of aspirin, s/p heparin gtt  - s/p JIMENA x 2 (co-LAD x 2)  - metoprolol 12.5 mg twice daily with holding parameters  - atorvastatin 80 mg daily  - s/p lipid panel, TSH and hemoglobin A1c  -Cardiology following, appreciate recs  -- Post PCI Angiomax x4 hours 11/5  -- DAPT Effient and aspirin.      Acute systolic heart failure (HCC)- (present on admission)  Assessment & Plan  - EF 45%  - started on BB  - if BP uncontrolled consider ACEi/ARB    Essential hypertension- (present on admission)  Assessment & Plan  Uncontrolled  - metoprolol 12.5 mg twice daily    Tobacco abuse- (present on admission)  Assessment & Plan  Tobacco cessation education provided for more than 11  minutes, discussed options of nicotine patch, medical treatment with wellbutrin and chantix. Discussed the risks of smoking including increased risk of heart disease, stroke, cancer and COPD. Discussed the benefits of quitting smoking.  Patient states he will quit smoking.  We will avoid nicotine replacement in the setting of ACS         VTE prophylaxis: SCDs tonight then lovenox/heparin in am

## 2019-11-07 NOTE — PROGRESS NOTES
Report given to [facility] RN. Family updated to transfer. All personal belongings collected. Will deliver to new room once transfer complete.

## 2019-11-07 NOTE — PROGRESS NOTES
Patient arrived to unit at 1612.    Report received from cath lab RN.  Right radial approach.  Per Cath lab RN, 12 mL in TR band.    Will begin process to remove.   CHG completed,   Call light within reach  Will continue to monitor closely.

## 2019-11-07 NOTE — CARE PLAN
Problem: Safety  Goal: Will remain free from injury  Note:   Bed locked and in low position. Alarm activated. Non-skid socks. Pt calls appropriately.     Problem: Urinary Elimination:  Goal: Ability to reestablish a normal urinary elimination pattern will improve  Note:   Pt voids adequately into urinal.

## 2019-11-08 VITALS
SYSTOLIC BLOOD PRESSURE: 93 MMHG | RESPIRATION RATE: 15 BRPM | DIASTOLIC BLOOD PRESSURE: 59 MMHG | OXYGEN SATURATION: 96 % | HEIGHT: 67 IN | WEIGHT: 133.38 LBS | BODY MASS INDEX: 20.93 KG/M2 | HEART RATE: 76 BPM | TEMPERATURE: 97.4 F

## 2019-11-08 LAB
BASOPHILS # BLD AUTO: 0.3 % (ref 0–1.8)
BASOPHILS # BLD: 0.02 K/UL (ref 0–0.12)
EOSINOPHIL # BLD AUTO: 0.08 K/UL (ref 0–0.51)
EOSINOPHIL NFR BLD: 1.1 % (ref 0–6.9)
ERYTHROCYTE [DISTWIDTH] IN BLOOD BY AUTOMATED COUNT: 44.7 FL (ref 35.9–50)
HCT VFR BLD AUTO: 34 % (ref 42–52)
HGB BLD-MCNC: 11.3 G/DL (ref 14–18)
IMM GRANULOCYTES # BLD AUTO: 0.03 K/UL (ref 0–0.11)
IMM GRANULOCYTES NFR BLD AUTO: 0.4 % (ref 0–0.9)
LYMPHOCYTES # BLD AUTO: 1.38 K/UL (ref 1–4.8)
LYMPHOCYTES NFR BLD: 18.7 % (ref 22–41)
MCH RBC QN AUTO: 33.9 PG (ref 27–33)
MCHC RBC AUTO-ENTMCNC: 33.2 G/DL (ref 33.7–35.3)
MCV RBC AUTO: 102.1 FL (ref 81.4–97.8)
MONOCYTES # BLD AUTO: 0.75 K/UL (ref 0–0.85)
MONOCYTES NFR BLD AUTO: 10.1 % (ref 0–13.4)
NEUTROPHILS # BLD AUTO: 5.13 K/UL (ref 1.82–7.42)
NEUTROPHILS NFR BLD: 69.4 % (ref 44–72)
NRBC # BLD AUTO: 0 K/UL
NRBC BLD-RTO: 0 /100 WBC
PLATELET # BLD AUTO: 121 K/UL (ref 164–446)
PMV BLD AUTO: 9.9 FL (ref 9–12.9)
RBC # BLD AUTO: 3.33 M/UL (ref 4.7–6.1)
WBC # BLD AUTO: 7.4 K/UL (ref 4.8–10.8)

## 2019-11-08 PROCEDURE — 85025 COMPLETE CBC W/AUTO DIFF WBC: CPT

## 2019-11-08 PROCEDURE — 99239 HOSP IP/OBS DSCHRG MGMT >30: CPT | Performed by: HOSPITALIST

## 2019-11-08 PROCEDURE — A9270 NON-COVERED ITEM OR SERVICE: HCPCS | Performed by: HOSPITALIST

## 2019-11-08 PROCEDURE — 700102 HCHG RX REV CODE 250 W/ 637 OVERRIDE(OP): Performed by: INTERNAL MEDICINE

## 2019-11-08 PROCEDURE — A9270 NON-COVERED ITEM OR SERVICE: HCPCS | Performed by: INTERNAL MEDICINE

## 2019-11-08 PROCEDURE — 700102 HCHG RX REV CODE 250 W/ 637 OVERRIDE(OP): Performed by: HOSPITALIST

## 2019-11-08 PROCEDURE — 97535 SELF CARE MNGMENT TRAINING: CPT

## 2019-11-08 PROCEDURE — 99232 SBSQ HOSP IP/OBS MODERATE 35: CPT | Performed by: INTERNAL MEDICINE

## 2019-11-08 RX ORDER — ASPIRIN 81 MG/1
81 TABLET ORAL DAILY
Qty: 100 TAB | Refills: 0 | Status: SHIPPED | OUTPATIENT
Start: 2019-11-09

## 2019-11-08 RX ORDER — PRASUGREL 10 MG/1
10 TABLET, FILM COATED ORAL DAILY
Qty: 30 TAB | Refills: 0 | Status: SHIPPED | OUTPATIENT
Start: 2019-11-08

## 2019-11-08 RX ORDER — ATORVASTATIN CALCIUM 80 MG/1
80 TABLET, FILM COATED ORAL EVERY EVENING
Qty: 30 TAB | Refills: 0 | Status: SHIPPED | OUTPATIENT
Start: 2019-11-08

## 2019-11-08 RX ADMIN — PRASUGREL 10 MG: 10 TABLET, FILM COATED ORAL at 05:59

## 2019-11-08 RX ADMIN — ASPIRIN 81 MG: 81 TABLET, COATED ORAL at 05:59

## 2019-11-08 RX ADMIN — THERA TABS 1 TABLET: TAB at 05:59

## 2019-11-08 ASSESSMENT — ENCOUNTER SYMPTOMS
NEUROLOGICAL NEGATIVE: 1
NERVOUS/ANXIOUS: 0
BLURRED VISION: 0
DIARRHEA: 0
SHORTNESS OF BREATH: 0
COUGH: 0
DOUBLE VISION: 0
PSYCHIATRIC NEGATIVE: 1
WEAKNESS: 0
FEVER: 0
MYALGIAS: 0
SORE THROAT: 0
RESPIRATORY NEGATIVE: 1
CHILLS: 0
GASTROINTESTINAL NEGATIVE: 1
CONSTITUTIONAL NEGATIVE: 1
BACK PAIN: 0
VOMITING: 0
NAUSEA: 0
DIZZINESS: 0
LOSS OF CONSCIOUSNESS: 0
MUSCULOSKELETAL NEGATIVE: 1
CARDIOVASCULAR NEGATIVE: 1
ABDOMINAL PAIN: 0
BRUISES/BLEEDS EASILY: 0
HEADACHES: 0
EYES NEGATIVE: 1
PALPITATIONS: 0

## 2019-11-08 NOTE — PROGRESS NOTES
"[facility] Medicine Daily Progress Note    Date of Service  11/8/2019    Chief Complaint  64 y.o. male admitted 11/4/2019 with NSTEMI    Hospital Course    PMHx HTN, Tobacco abuse.  Admitted with CP and went to cath lab on 11/5.  There was treated with an JIMENA to the LAD.  Was transferred back to the floor.  Later on 11/6 pt developed re-occurence of his neck and CP.  Repeat EKG showinig worsening ST segment elevation and pt taken emergently back to the cath lab.  Repeat angiography however did not show any restenosis.        Interval Problem Update  No more chest \"pressure\" overnight  Sinus 70-80s  SBPs low and given 1,000ml bolus  Tolerating po  AFebrile  UOP >1,000ml/12hrs    Consultants/Specialty  cardiology    Code Status  full    Disposition  OK to Tele    Review of Systems  Review of Systems   Constitutional: Negative for chills and fever.   HENT: Negative for nosebleeds and sore throat.    Eyes: Negative for blurred vision and double vision.   Respiratory: Negative for cough and shortness of breath.    Cardiovascular: Negative for chest pain, palpitations and leg swelling.   Gastrointestinal: Negative for abdominal pain, diarrhea, nausea and vomiting.   Genitourinary: Negative for dysuria and urgency.   Musculoskeletal: Negative for back pain.   Skin: Negative for rash.   Neurological: Negative for dizziness, loss of consciousness and headaches.        Physical Exam  Temp:  [36.4 °C (97.5 °F)-37.5 °C (99.5 °F)] 37 °C (98.6 °F)  Pulse:  [63-87] 63  Resp:  [15-19] 15  BP: (82-95)/(51-66) 82/52  SpO2:  [90 %-97 %] 90 %    Physical Exam  Constitutional:       General: He is not in acute distress.     Appearance: He is well-developed and underweight. He is not diaphoretic.   HENT:      Head: Normocephalic and atraumatic.   Eyes:      Conjunctiva/sclera: Conjunctivae normal.   Neck:      Vascular: No JVD.   Cardiovascular:      Rate and Rhythm: Normal rate.      Heart sounds: No murmur. No gallop.    Pulmonary:      " Effort: Pulmonary effort is normal. No respiratory distress.      Breath sounds: No stridor. No wheezing or rales.   Abdominal:      Palpations: Abdomen is soft.      Tenderness: There is no tenderness. There is no guarding or rebound.   Skin:     General: Skin is warm and dry.      Findings: No rash.   Neurological:      Mental Status: He is oriented to person, place, and time.   Psychiatric:         Thought Content: Thought content normal.         Fluids    Intake/Output Summary (Last 24 hours) at 11/8/2019 0537  Last data filed at 11/8/2019 0000  Gross per 24 hour   Intake 1180 ml   Output 250 ml   Net 930 ml       Laboratory  Recent Labs     11/06/19  0135 11/07/19  0527 11/08/19  0400   WBC 11.9* 8.9 7.4   RBC 4.10* 3.27* 3.33*   HEMOGLOBIN 13.8* 11.1* 11.3*   HEMATOCRIT 39.6* 32.7* 34.0*   MCV 96.6 101.8* 102.1*   MCH 33.7* 33.3* 33.9*   MCHC 34.8 32.7* 33.2*   RDW 41.2 44.7 44.7   PLATELETCT 136* 116* 121*   MPV 9.8 10.2 9.9     Recent Labs     11/06/19 0135 11/07/19  0527   SODIUM 138 140   POTASSIUM 3.4* 4.1   CHLORIDE 109 115*   CO2 22 18*   GLUCOSE 95 95   BUN 11 12   CREATININE 0.51 0.61   CALCIUM 8.5 7.1*     Recent Labs     11/06/19 0135   APTT 33.8               Imaging  CL-LEFT HEART CATHETERIZATION WITH POSSIBLE INTERVENTION   Final Result      CL-LEFT HEART CATHETERIZATION WITH POSSIBLE INTERVENTION   Final Result      DX-CHEST-LIMITED (1 VIEW)   Final Result         1.  No acute cardiopulmonary disease.      OUTSIDE IMAGES-DX CHEST   Final Result      EC-ECHOCARDIOGRAM COMPLETE W/O CONT   Final Result      CL-LEFT HEART CATHETERIZATION WITH POSSIBLE INTERVENTION    (Results Pending)        Assessment/Plan  NSTEMI (non-ST elevated myocardial infarction) (HCC)- (present on admission)  Assessment & Plan  Patient has been given full dose of aspirin, s/p heparin gtt  - s/p JIMENA x 2 (co-LAD x 2)  - metoprolol 12.5 mg twice daily with holding parameters  - atorvastatin 80 mg daily  - s/p lipid panel,  TSH and hemoglobin A1c  -Cardiology following, appreciate recs  -- DAPT Effient and aspirin.  Recurrent pain with EKG changes leading to repeat cath which was neg for re-stenosis      Acute systolic heart failure (HCC)- (present on admission)  Assessment & Plan  - EF 45%  - started on BB  Pressures to low to allow ACEI at present  Compensated  ICM    Essential hypertension- (present on admission)  Assessment & Plan  Uncontrolled  - metoprolol 12.5 mg twice daily    Tobacco abuse- (present on admission)  Assessment & Plan  Pt counseled x 5 mins as part of his lifestyles changes for his CAD         VTE prophylaxis: ambulatory

## 2019-11-08 NOTE — PROGRESS NOTES
Discharge orders received. IV and tele monitor removed. Discharge instructions, follow up appointments, and medications discussed with Pt. Pt and wife expressed understanding. Pt accompanied off the floor with CNA. Wife transporting Pt back to [place].

## 2019-11-08 NOTE — THERAPY
Physical Therapy: Pt was seen today for education with phase I cardiac rehab. Handout given, topics reviewed include walking program progression, talk test, RPE scale, HR recommendations and introduced phase II cardiac rehab. Pt has regular walking habit already and is clear on gradual return to exercise. No further inpt PT needs. -Becca Irvin, Pt

## 2019-11-08 NOTE — DISCHARGE INSTRUCTIONS
Discharge Instructions    Discharged to home by car with friend. Discharged via wheelchair, hospital escort: Yes.  Special equipment needed: Not Applicable    Be sure to schedule a follow-up appointment with your primary care doctor or any specialists as instructed.     Discharge Plan:   Smoking Cessation Offered: Patient Refused  Influenza Vaccine Indication: Patient Refuses    I understand that a diet low in cholesterol, fat, and sodium is recommended for good health. Unless I have been given specific instructions below for another diet, I accept this instruction as my diet prescription.   Other diet: Cardiac    Special Instructions:     Special Instructions: Diagnosis:  Acute Coronary Syndrome (ACS) is a diagnosis that encompasses cardiac-related chest pain and heart attack. ACS occurs when the blood flow to the heart muscle is severely reduced or cut off completely due to a slow process called atherosclerosis.  Atherosclerosis is a disease in which the coronary arteries become narrow from a buildup of fat, cholesterol, and other substances that combine to form plaque. If the plaque breaks, a blood clot will form and block the blood flow to the heart muscle. This lack of blood flow can cause damage or death to the heart muscle which is called a heart attack or Myocardial Infarction (MI). There are two different types of MIs:  ST Elevation Myocardial Infarction or STEMI (the most severe type of heart attack) and Non-ST Elevation Myocardial Infarction or NSTEMI.    Treatment Plan:  · Cardiac Diet  - Low fat, low salt, low cholesterol   · Cardiac Rehab  - Your doctor has ordered you a referral to [facility].  Call [phone] to schedule an appointment.  · Attend my follow-up appointment with my Cardiologist.  · Take my medications as prescribed by my doctor  · Exercise daily  · Lower my bad cholesterol and raise my good cholesterol and Reduce stress    HF Patient Discharge Instructions  · Monitor your weight  daily, and maintain a weight chart, to track your weight changes.   · Activity as tolerated, unless your Doctor has ordered otherwise. Other activity order: As tolerated.  · Follow a low fat, low cholesterol, low salt diet unless instructed otherwise by your Doctor. Read the labels on the back of food products and track your intake of fat, cholesterol and salt.   · Fluid Restriction No. If a Fluid Restriction has been ordered by your Doctor, measure fluids with a measuring cup to ensure that you are not exceeding the restriction.   · No smoking.  · Oxygen No. If your Doctor has ordered that you wear Oxygen at home, it is important to wear it as ordered.  · Did you receive an explanation from staff on the importance of taking each of your medications and why it is necessary to keep taking them unless your doctor says to stop? Yes  · Were all of your questions answered about how to manage your heart failure and what to do if you have increased signs and symptoms after you go home? Yes  · Do you feel like your heart failure care team involved you in the care treatment plan and allowed you to make decisions regarding your care while in the hospital and addressed any discharge needs you might have? Yes    See the educational handout provided at discharge for more information on monitoring your daily weight, activity and diet. This also explains more about Heart Failure, symptoms of a flare-up and some of the tests that you have undergone.     Warning Signs of a Flare-Up include:  · Swelling in the ankles or lower legs.  · Shortness of breath, while at rest, or while doing normal activities.   · Shortness of breath at night when in bed, or coughing in bed.   · Requiring more pillows to sleep at night, or needing to sit up at night to sleep.  · Feeling weak, dizzy or fatigued.     When to call your Doctor:  · Call [facility] seven days a week from 8:00 a.m. to 8:00 p.m. for medical questions [phone]  [phone].  · Call your Primary Care Physician or Cardiologist if:   1. You experience any pain radiating to your jaw or neck.  2. You have any difficulty breathing.  3. You experience weight gain of 3 lbs in a day or 5 lbs in a week.   4. You feel any palpitations or irregular heartbeats.  5. You become dizzy or lose consciousness.   If you have had an angiogram or had a pacemaker or AICD placed, and experience:  1. Bleeding, drainage or swelling at the surgical / puncture site.  2. Fever greater than 100.0 F  3. Shock from internal defibrillator.  4. Cool and / or numb extremities.      · Is patient discharged on Warfarin / Coumadin?   No     Depression / Suicide Risk    As you are discharged from this [facility] facility, it is important to learn how to keep safe from harming yourself.    Recognize the warning signs:  · Abrupt changes in personality, positive or negative- including increase in energy   · Giving away possessions  · Change in eating patterns- significant weight changes-  positive or negative  · Change in sleeping patterns- unable to sleep or sleeping all the time   · Unwillingness or inability to communicate  · Depression  · Unusual sadness, discouragement and loneliness  · Talk of wanting to die  · Neglect of personal appearance   · Rebelliousness- reckless behavior  · Withdrawal from people/activities they love  · Confusion- inability to concentrate     If you or a loved one observes any of these behaviors or has concerns about self-harm, here's what you can do:  · Talk about it- your feelings and reasons for harming yourself  · Remove any means that you might use to hurt yourself (examples: pills, rope, extension cords, firearm)  · Get professional help from the community (Mental Health, Substance Abuse, psychological counseling)  · Do not be alone:Call your Safe Contact- someone whom you trust who will be there for you.  · Call your local CRISIS HOTLINE 012-0281 or 115-299-0219  · Call your  local Children's Mobile Crisis Response Team [place] (225) 374-4270 or www.BaseKit  · Call the toll free National Suicide Prevention Hotlines   · National Suicide Prevention Lifeline 923-047-MPUS (8532)  · [employer] [facility] 800-SUICIDE (399-1734)            Acute Coronary Syndrome  Acute coronary syndrome (ACS) is a serious problem in which there is suddenly not enough blood and oxygen supplied to the heart. ACS may mean that one or more of the blood vessels in your heart (coronary arteries) may be blocked. ACS can result in chest pain or a heart attack (myocardial infarction or MI).  What are the causes?  This condition is caused by atherosclerosis, which is the buildup of fat and cholesterol (plaque) on the inside of the arteries. Over time, the plaque may narrow or block the artery, and this will lessen blood flow to the heart. Plaque can also become weak and break off within a coronary artery to form a clot and cause a sudden blockage.  What increases the risk?  The risk factors of this condition include:  · High cholesterol levels.  · High blood pressure (hypertension).  · Smoking.  · Diabetes.  · Age.  · Family history of chest pain, heart disease, or stroke.  · Lack of exercise.  What are the signs or symptoms?  The most common signs of this condition include:  · Chest pain, which can be:  ¨ A crushing or squeezing in the chest.  ¨ A tightness, pressure, fullness, or heaviness in the chest.  ¨ Present for more than a few minutes, or it can stop and recur.  · Pain in the arms, neck, jaw, or back.  · Unexplained heartburn or indigestion.  · Shortness of breath.  · Nausea.  · Sudden cold sweats.  · Feeling light-headed or dizzy.  Sometimes, this condition has no symptoms.  How is this diagnosed?  ACS may be diagnosed through the following tests:  · Electrocardiogram (ECG).  · Blood tests.  · Coronary angiogram. This is a procedure to look at the coronary arteries to see if there is any  blockage.  How is this treated?  Treatment for ACS may include:  · Healthy behavioral changes to reduce or control risk factors.  · Medicine.  · Coronary stenting. A stent helps to keep an artery open.  · Coronary angioplasty. This procedure widens a narrowed or blocked artery.  · Coronary artery bypass surgery. This will allow your blood to pass the blockage (bypass) to reach your heart.  Follow these instructions at home:  Eating and drinking  · Follow a heart-healthy diet. A dietitian can you help to educate you about healthy food options and changes.  · Use healthy cooking methods such as roasting, grilling, broiling, baking, poaching, steaming, or stir-frying. Talk to a dietitian to learn more about healthy cooking methods.  Medicines  · Take medicines only as directed by your health care provider.  · Do not take the following medicines unless your health care provider approves:  ¨ Nonsteroidal anti-inflammatory drugs (NSAIDs), such as ibuprofen, naproxen, or celecoxib.  ¨ Vitamin supplements that contain vitamin A, vitamin E, or both.  ¨ Hormone replacement therapy that contains estrogen with or without progestin.  · Stop illegal drug use.  Activity  · Follow an exercise program that is approved by your health care provider.  · Plan rest periods when you are fatigued.  Lifestyle  · Do not use any tobacco products, including cigarettes, chewing tobacco, or electronic cigarettes. If you need help quitting, ask your health care provider.  · If you drink alcohol, and your health care provider approves, limit your alcohol intake to no more than 1 drink per day. One drink equals 12 ounces of beer, 5 ounces of wine, or 1½ ounces of hard liquor.  · Learn to manage stress.  · Maintain a healthy weight. Lose weight as approved by your health care provider.  General instructions  · Manage other health conditions, such as hypertension and diabetes, as directed by your health care provider.  · Keep all follow-up visits as  directed by your health care provider. This is important.  · Your health care provider may ask you to monitor your blood pressure. A blood pressure reading consists of a higher number over a lower number, such as 110 over 72, written as 110/72. Ideally, your blood pressure should be:  ¨ Below 140/90 if you have no other medical conditions.  ¨ Below 130/80 if you have diabetes or kidney disease.  Get help right away if:  · You have pain in your chest, neck, arm, jaw, stomach, or back that lasts more than a few minutes, is recurring, or is not relieved by taking medicine under your tongue (sublingual nitroglycerin).  · You have profuse sweating without cause.  · You have unexplained:  ¨ Heartburn or indigestion.  ¨ Shortness of breath or difficulty breathing.  ¨ Nausea or vomiting.  ¨ Fatigue.  ¨ Feelings of nervousness or anxiety.  ¨ Weakness.  ¨ Diarrhea.  · You have sudden light-headedness or dizziness.  · You faint.  These symptoms may represent a serious problem that is an emergency. Do not wait to see if the symptoms will go away. Get medical help right away. Call your local emergency services (911 in the [place] Do not drive yourself to the clinic or hospital.   This information is not intended to replace advice given to you by your health care provider. Make sure you discuss any questions you have with your health care provider.  Document Released: 12/18/2006 Document Revised: 05/31/2017 Document Reviewed: 04/21/2015  [employer] Interactive Patient Education © 2017 [employer]      Heart Disease Prevention  Heart disease is a leading cause of death. There are many things you can do to help prevent heart disease.  Be physically active  Physical activity is good for your heart. It helps control your blood pressure, cholesterol levels, and weight. Try to be physically active every day. Ask your health care provider what activities are best for you.  Be a healthy weight  Extra weight can strain your heart and  affect your blood pressure and cholesterol levels. Lose weight with diet and exercise if recommended by your health care provider.  Eat heart-healthy foods  Follow a healthy eating plan as recommended by your health care provider or dietitian. Heart-healthy foods include:  · High-fiber foods. These include oat bran, oatmeal, and whole-grain breads and cereals.  · Fruits and vegetables.  Avoid:  · Alcohol.  · Fried foods.  · Foods high in saturated fat. These include meats, butter, whole dairy products, shortening, and coconut or palm oil.  · Salty foods. These include canned food, luncheon meat, salty snacks, and fast food.  Keep your cholesterol levels under control  Cholesterol is a substance that is used for many important functions. When your cholesterol levels are high, cholesterol can stick to the insides of your blood vessels, making them narrow or clog. This can lead to chest pain (angina) and a heart attack.  Keep your cholesterol levels under control as recommended by your health care provider. Have your cholesterol checked at least once a year. Target cholesterol levels (in mg/dL) for most people are:  · Total cholesterol below 200.  · LDL cholesterol below 100.  · HDL cholesterol above 40 in men and above 50 in women.  · Triglycerides below 150.  Keep your blood pressure under control  Having high blood pressure (hypertension) puts you at risk for stroke and other forms of heart disease. Keep your blood pressure under control as recommended by your health care provider. Ask your health care provider if you need treatment to lower your blood pressure. If you are 18-39 years of age, have your blood pressure checked every 3-5 years. If you are 40 years of age or older, have your blood pressure checked every year.  Do not use tobacco products  Tobacco smoke can damage your heart and blood vessels. Do not use any tobacco products including cigarettes, chewing tobacco, or electronic cigarettes. If you need  help quitting, ask your health care provider.  Take medicines as directed  Take medicines only as directed by your health care provider. Ask your health care provider whether you should take an aspirin every day. Taking aspirin can help reduce your risk of heart disease and stroke.  Where to find more information:  To find out more about heart disease, visit the American Heart Association's website at www.americanheart.org  This information is not intended to replace advice given to you by your health care provider. Make sure you discuss any questions you have with your health care provider.  Document Released: 08/01/2005 Document Revised: 05/17/2017 Document Reviewed: 02/11/2015  [employer]vier Interactive Patient Education © 2017 [employer]

## 2019-11-08 NOTE — PROGRESS NOTES
Cardiology Follow Up Progress Note    Date of Service  11/8/2019    Attending Physician  Mir Warren M.D.    Chief Complaint   Non-STEMI, coronary artery disease status post stent placement, ischemic cardiomyopathy.      SARA Quintanilla is a 64 y.o. male admitted 11/4/2019 with above.    Interim Events  No significant changes noted from cardiac standpoint within the past 24 hours. He is ambulating well without angina. His strength has improved.    Review of Systems  Review of Systems   Constitutional: Negative.  Negative for chills and fever.   HENT: Negative.  Negative for hearing loss.    Eyes: Negative.    Respiratory: Negative.  Negative for cough and shortness of breath.    Cardiovascular: Negative.  Negative for chest pain, palpitations and leg swelling.   Gastrointestinal: Negative.  Negative for abdominal pain, nausea and vomiting.   Genitourinary: Negative.  Negative for dysuria and urgency.   Musculoskeletal: Negative.  Negative for myalgias.   Skin: Negative.  Negative for rash.   Neurological: Negative.  Negative for dizziness, weakness and headaches.   Hematological: Does not bruise/bleed easily.   Psychiatric/Behavioral: Negative.  The patient is not nervous/anxious.        Vital signs in last 24 hours  Temp:  [36.4 °C (97.5 °F)-37.5 °C (99.5 °F)] 37 °C (98.6 °F)  Pulse:  [55-86] 55  Resp:  [15-18] 15  BP: (82-95)/(51-66) 83/57  SpO2:  [90 %-97 %] 92 %    Physical Exam  Physical Exam  Constitutional:       Appearance: He is well-developed.      Interventions: He is sedated. He is not intubated.  HENT:      Head: Normocephalic and atraumatic.   Eyes:      Conjunctiva/sclera: Conjunctivae normal.      Pupils: Pupils are equal, round, and reactive to light.   Neck:      Musculoskeletal: Normal range of motion and neck supple.   Cardiovascular:      Rate and Rhythm: Normal rate and regular rhythm.   Pulmonary:      Effort: Pulmonary effort is normal. He is not intubated.      Breath sounds: Normal  breath sounds.   Abdominal:      General: Bowel sounds are normal.      Palpations: Abdomen is soft.   Musculoskeletal: Normal range of motion.   Skin:     General: Skin is warm and dry.   Neurological:      Mental Status: He is alert and oriented to person, place, and time.         Lab Review  Lab Results   Component Value Date/Time    WBC 7.4 11/08/2019 04:00 AM    RBC 3.33 (L) 11/08/2019 04:00 AM    HEMOGLOBIN 11.3 (L) 11/08/2019 04:00 AM    HEMATOCRIT 34.0 (L) 11/08/2019 04:00 AM    .1 (H) 11/08/2019 04:00 AM    MCH 33.9 (H) 11/08/2019 04:00 AM    MCHC 33.2 (L) 11/08/2019 04:00 AM    MPV 9.9 11/08/2019 04:00 AM      Lab Results   Component Value Date/Time    SODIUM 140 11/07/2019 05:27 AM    POTASSIUM 4.1 11/07/2019 05:27 AM    CHLORIDE 115 (H) 11/07/2019 05:27 AM    CO2 18 (L) 11/07/2019 05:27 AM    GLUCOSE 95 11/07/2019 05:27 AM    BUN 12 11/07/2019 05:27 AM    CREATININE 0.61 11/07/2019 05:27 AM      Lab Results   Component Value Date/Time    ASTSGOT 85 (H) 11/05/2019 01:25 AM    ALTSGPT 23 11/05/2019 01:25 AM     Lab Results   Component Value Date/Time    CHOLSTRLTOT 161 11/05/2019 01:25 AM     (H) 11/05/2019 01:25 AM    HDL 37 (A) 11/05/2019 01:25 AM    TRIGLYCERIDE 71 11/05/2019 01:25 AM    TROPONINT 966 (H) 11/04/2019 09:37 PM       No results for input(s): NTPROBNP in the last 72 hours.  (Above labs reviewed.)       Current Facility-Administered Medications:   •  aspirin EC (ECOTRIN) tablet 81 mg, 81 mg, Oral, DAILY, Salo Valle M.D., 81 mg at 11/08/19 0559  •  prasugrel (EFFIENT) tablet 10 mg, 10 mg, Oral, DAILY, Sourav Adler M.D., 10 mg at 11/08/19 0559  •  senna-docusate (PERICOLACE or SENOKOT S) 8.6-50 MG per tablet 2 Tab, 2 Tab, Oral, BID, Stopped at 11/06/19 1737 **AND** polyethylene glycol/lytes (MIRALAX) PACKET 1 Packet, 1 Packet, Oral, QDAY PRN **AND** magnesium hydroxide (MILK OF MAGNESIA) suspension 30 mL, 30 mL, Oral, QDAY PRN **AND** bisacodyl (DULCOLAX)  suppository 10 mg, 10 mg, Rectal, QDAY PRN, Julio César Hernandez M.D.  •  Respiratory Care per Protocol, , Nebulization, Continuous RT, Julio César Hernandez M.D.  •  acetaminophen (TYLENOL) tablet 650 mg, 650 mg, Oral, Q6HRS PRN, Julio César Hernandez M.D., 650 mg at 11/07/19 2045  •  atorvastatin (LIPITOR) tablet 80 mg, 80 mg, Oral, Q EVENING, Julio César Hernandez M.D., 80 mg at 11/07/19 1708  •  nitroglycerin (NITROSTAT) tablet 0.4 mg, 0.4 mg, Sublingual, Q5 MIN PRN, Julio César Hernandez M.D., 0.4 mg at 11/04/19 2102  •  morphine (pf) 4 MG/ML injection 2-4 mg, 2-4 mg, Intravenous, Q3HRS PRN, Julio César Hernandez M.D., 2 mg at 11/06/19 1714  •  ondansetron (ZOFRAN) syringe/vial injection 4 mg, 4 mg, Intravenous, Q4HRS PRN, Julio César Hernandez M.D.  •  ondansetron (ZOFRAN ODT) dispertab 4 mg, 4 mg, Oral, Q4HRS PRN, Julio César Hernandez M.D.  •  multivitamin (THERAGRAN) tablet 1 Tab, 1 Tab, Oral, DAILY, Juhi Farrell M.D., 1 Tab at 11/08/19 0559  (Medications reviewed.)    Cardiac Imaging and Procedures Review    CARDIAC STUDIES/PROCEDURES:     CARDIAC CATHETERIZATION CONCLUSIONS by Mir June (11/06/19)  Conclusions  1. Noncoronary etiology of chest pain.  2. Widely patent stent in the diagonal vessel.  3. Moderate stenosis of the proximal LAD, which is heavily calcified but  non-flow-limiting by FFR (0.89).  4. Normal LVEDP.    CARDIAC CATHETERIZATION CONCLUSIONS by Salo Méndez (11/06/19E)  Successful PCI proximal co-LAD (2.25 x 15 mm Johnny JIMENA), excellent result      CARDIAC CATHETERIZATION CONCLUSIONS by Sourav Duggan (11/05/19)  1.  Non-ST elevation myocardial infarction.  Coronary stent implantation, mid diagonal branch with a 2.0 x 22 mm Resolute Johnny drug-eluting stent.   2.  Left ventricular ejection fraction of 45% with anterior apical wall motion abnormalities.      ECHOCARDIOGRAM CONCLUSIONS (11/04/19)  Mildly reduced left ventricular systolic function.  Left ventricular ejection fraction is visually estimated to be 45%.  Akinesis distal  septum anteroapical inferoapical walls.  Unable to estimate pulmonary artery pressure due to an inadequate   tricuspid regurgitant jet.  No prior study is available for comparison.      EKG performed on (11/07/19) EKG personally interpreted shows sinus rhythm with anterior Q waves.  EKG performed on (11/06/19) EKG personally interpreted shows sinus rhythm with anterior ST elevation.    Assessment/Plan  1. Coronary artery disease: He remains clinically doing well. I will continue with current medical care including aspirin, prasugrel and atorvastatin. We will continue to hold beta blockade therapy and ace inhibitor therapy due to low blood pressure. This may be addressed  As outpatient. He may be discharged to home today  2. Ischemic cardiomyopathy: The overall volume status is adequate. We will continue with current care     Thank you for allowing me to participate in the care of this patient.  Cardiology will sign off on this patient    Please contact me with any questions.    Evin Dahl M.D.   Cardiologist, [facility]  [phone]

## 2019-11-09 NOTE — DISCHARGE SUMMARY
Discharge Summary    CHIEF COMPLAINT ON ADMISSION  No chief complaint on file.      Reason for Admission  chest pain     Admission Date  11/4/2019    CODE STATUS  Prior    HPI & HOSPITAL COURSE  This is a 64 y.o. male who presented for evaluation on November 4, 2019.  At that time his complaint was chest pain which had begun on the day prior to his presentation.  He described substernal pain rating to his left arm.  He was quite severe with a rating of 8 out of 10.  He was originally seen at [facility].  EKG there demonstrated minimal ST elevation in the inferior leads a CTA done at that facility was also negative for pulmonary embolism.  He was started on a heparin drip and sent to our facility.  The patient was initially admitted to our telemetry unit, and cardiology was consulted.    He subsequently went to the cardiac Cath Lab on November 5.  Here a drug-eluting stent was placed to the mid diagonal branch.  He was then recovered and went back to telemetry.  Subsequently on the 6 the patient again developed chest pain which he felt was very similar to what had brought him to the hospital.  There was concern for in-stent restenosis and he was taking emergently back to the Cath Lab.  On this occasion he was noted to have no in-stent restenosis, or any other significant coronary artery disease.    The remainder of his hospital stay was uneventful.  The patient remained with very much minimal pain 1 out of 10 which resolved.  He had no further flares of his chest pain.  His pressures remained stable though on the low side and for this reason he was not started on a beta-blocker or ACE inhibitor though they would be indicated if his pressures will tolerate at a later date.  He will be discharged on double antiplatelet therapy, and a high-dose statin.      Therefore, he is discharged in good and stable condition to home with close outpatient follow-up.    The patient met 2-midnight criteria for an inpatient  stay at the time of discharge.    Discharge Date  11/8/2019    FOLLOW UP ITEMS POST DISCHARGE  With cardiology and primary care    DISCHARGE DIAGNOSES  Active Problems:    NSTEMI (non-ST elevated myocardial infarction) (HCC) POA: Yes    Tobacco abuse POA: Yes    Essential hypertension POA: Yes    Acute systolic heart failure (HCC) POA: Yes  Resolved Problems:    * No resolved hospital problems. *      FOLLOW UP  Future Appointments   Date Time Provider Department Center   11/15/2019  3:00 PM NEFTALI [facility] None     No follow-up provider specified.    MEDICATIONS ON DISCHARGE     Medication List      START taking these medications      Instructions   aspirin 81 MG EC tablet  Start taking on:  November 9, 2019   Take 1 Tab by mouth every day.  Dose:  81 mg     atorvastatin 80 MG tablet  Commonly known as:  LIPITOR   Take 1 Tab by mouth every evening.  Dose:  80 mg     prasugrel 10 MG Tabs  Commonly known as:  EFFIENT   Take 1 Tab by mouth every day.  Dose:  10 mg        CONTINUE taking these medications      Instructions   multivitamin Tabs   Take 1 Tab by mouth every day.  Dose:  1 Tab            Allergies  No Known Allergies    DIET  No orders of the defined types were placed in this encounter.      ACTIVITY  As tolerated.  Weight bearing as tolerated    CONSULTATIONS  Cardiology    PROCEDURES  Cardiac catheterization on November 5, and November 6    LABORATORY  Lab Results   Component Value Date    SODIUM 140 11/07/2019    POTASSIUM 4.1 11/07/2019    CHLORIDE 115 (H) 11/07/2019    CO2 18 (L) 11/07/2019    GLUCOSE 95 11/07/2019    BUN 12 11/07/2019    CREATININE 0.61 11/07/2019        Lab Results   Component Value Date    WBC 7.4 11/08/2019    HEMOGLOBIN 11.3 (L) 11/08/2019    HEMATOCRIT 34.0 (L) 11/08/2019    PLATELETCT 121 (L) 11/08/2019        Total time of the discharge process exceeds 35 minutes.  I spent the majority that time with the patient reviewing the results of his studies, discharge  instructions and medications.  We also talked about follow-up and lifestyle changes.  On the day of discharge she is up and ambulating the unit eager to go home.

## 2019-11-11 ENCOUNTER — TELEPHONE (OUTPATIENT)
Dept: CARDIOLOGY | Facility: MEDICAL CENTER | Age: 64
End: 2019-11-11

## 2019-11-11 NOTE — TELEPHONE ENCOUNTER
pt question - LS consult in hospital   Received: Today   Message Contents   Daisynishant Rocha, Med Ass't  Maxine Bob R.N.             LS consult     Pt was prescribed Effient but hasn't started taking it because he did his own research & read that he has to be a certain weight to take this med, he is concerned due to being under 130 pounds. He can be reached at [phone]

## 2019-11-12 NOTE — TELEPHONE ENCOUNTER
Contacted patient.  Discussed Effient and purpose. Explained importance.  Patient's labs WNL.  Patient verbalizes he will start the effient tonight.     Patient would like to make a telemedicine appt instead of [facility] in clinic due to travel distance.  Cancelled Friday's [facility] appt per patient request.  Advised to contact clinic for further questions or concerns.     To scheduling - please contact patient to arrange TeleMedicine Appt instead

## 2019-11-18 ENCOUNTER — TELEPHONE (OUTPATIENT)
Dept: CARDIOLOGY | Facility: MEDICAL CENTER | Age: 64
End: 2019-11-18

## 2019-11-18 NOTE — DOCUMENTATION QUERY
[facility]                                                                       Query Response Note      PATIENT:               CONCEPCION OGDEN  ACCT #:                  [id number]  MRN:                     [id number]  :                      1955  ADMIT DATE:       2019 5:17 AM  DISCH DATE:        2019 11:42 AM  RESPONDING  PROVIDER #:        [id number]           QUERY TEXT:    Patient admitted for NSTEMI on . On  a JIMENA was placed in Mid diagonal LAD. Patient had recurrance of chest pain on morning of  and was taken back to cardiac cath lab by Dr. Valle. Dr. Valle's OP report documents diagnosis of STEMI post PCI.  However, there is no documentation of the subsequent STEMI on the DC Summary.     Please clarify status of this condition:    NOTE:  If an appropriate response is not listed below, please respond with a new note.       The patient's Clinical Indicators include:  EKG  - Mildly reduced left ventricular systolic function, Left ventricular ejection fraction is visually estimated to be 45%, old anterior MI  EKG  - personally interpreted shows sinus rhythm with anterior ST elevation  EKG  - personally interpreted shows sinus rhythm with anterior Q waves.  Options provided:   -- New anterior STEMI is ruled in   -- NSTEMI converted to STEMI   -- STEMI ruled out   -- Unable to determine      Query created by: Sawallisch, Beth on 11/15/2019 7:44 AM    RESPONSE TEXT:    STEMI ruled out       QUERY TEXT:    Patient admitted with NSTEMI  with PCI JIMENA on .  Had recurrance of chest pain and was taken back to cardiac cath lab with a 2nd JIMENA placed on . Patient had another recurrence of chest pain and was taken back to the cardiac cath lab a 3rd time in which an angioplasty was performed. OP report for Dr. Warren  documents post operative diagnosis as Post infarct pericarditis.  However,  there is no documentation of post infarction pericarditis in the DC Summary.    Please clarify status of this condition:    NOTE:  If an appropriate response is not listed below, please respond with a new note.       The patient's Clinical Indicators include:  NSTEMI  Acute systolic heart failure  Ischemic cardiomyopathy   HTN   CAD   Post infarction angina  Repeat EKG showing worsening ST segment elevation (11/6)  s/p JIMENA x 2 (co-LAD x 2)  metoprolol 12.5 mg twice daily with holding parameters  atorvastatin 80 mg daily  s/p lipid panel, TSH and hemoglobin A1c  Cardiology following, appreciate recs  Post PCI Angiomax x4 hours 11/5  DAPT Effient and aspirin.  Options provided:   -- Post infarction pericarditis is ruled in   -- Post infarction pericarditis is  ruled out   -- Unable to determine      Query created by: Sawallisch, Beth on 11/15/2019 7:44 AM    RESPONSE TEXT:    Post infarction pericarditis is ruled in          Electronically signed by:  NEIDA EDMONDS 11/18/2019 10:35 AM

## 2019-11-18 NOTE — TELEPHONE ENCOUNTER
MILKA/SHABBIR    Pt and his wife, Soniya have some question's on recent procedure was consulted by MILKA while in hospital. Please call [phone] or [phone] for further details.
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
## 2019-12-04 ENCOUNTER — TELEPHONE (OUTPATIENT)
Dept: CARDIOLOGY | Facility: MEDICAL CENTER | Age: 64
End: 2019-12-04

## 2019-12-04 DIAGNOSIS — I50.21 ACUTE SYSTOLIC HEART FAILURE (HCC): ICD-10-CM

## 2019-12-04 DIAGNOSIS — I10 ESSENTIAL HYPERTENSION: ICD-10-CM

## 2019-12-04 DIAGNOSIS — I21.4 NSTEMI (NON-ST ELEVATED MYOCARDIAL INFARCTION) (HCC): ICD-10-CM

## 2019-12-04 NOTE — TELEPHONE ENCOUNTER
LS Consult    Pt's wife Soniya is calling on behalf of . She is requesting a referral be sent to Dr Perla Rios in [place], [place]. P# [phone], F#[phone]. If question's she can be reached at [phone].

## 2019-12-06 NOTE — TELEPHONE ENCOUNTER
Contacted patient's wife Soniya.  Confirmed this doctor is a cardiologist.     Referral initiated.